# Patient Record
Sex: FEMALE | Race: WHITE | NOT HISPANIC OR LATINO | Employment: OTHER | URBAN - METROPOLITAN AREA
[De-identification: names, ages, dates, MRNs, and addresses within clinical notes are randomized per-mention and may not be internally consistent; named-entity substitution may affect disease eponyms.]

---

## 2024-09-23 ENCOUNTER — TELEPHONE (OUTPATIENT)
Dept: OBSTETRICS AND GYNECOLOGY | Facility: CLINIC | Age: 44
End: 2024-09-23
Payer: COMMERCIAL

## 2024-09-23 NOTE — TELEPHONE ENCOUNTER
----- Message from Karan Pringle sent at 9/23/2024 10:33 AM CDT -----  Name of Who is Calling:MAU PRINGLE [[id number]]                What is the request in detail: Pt calling because she wanted to know if her medical record was received.Please call back to further assist.                 Can the clinic reply by [facility]: No                What Number to Call Back [facility] not [facility]: [phone]

## 2024-10-01 ENCOUNTER — TELEPHONE (OUTPATIENT)
Dept: OBSTETRICS AND GYNECOLOGY | Facility: CLINIC | Age: 44
End: 2024-10-01
Payer: COMMERCIAL

## 2024-10-01 NOTE — TELEPHONE ENCOUNTER
----- Message from Karan sent at 9/30/2024 10:14 AM CDT -----  Name of Who is Calling:MAU ORTIZ [[id number]]                What is the request in detail: Pt calling because she want to know if records was faxed over, states she did talk to someone last week but it wasn't received then. Pt just want to follow up because her doctor said they faxed it over.Please call back to further assist.                 Can the clinic reply by [facility]: No                What Number to Call Back [facility]:[phone]

## 2024-10-02 ENCOUNTER — INITIAL PRENATAL (OUTPATIENT)
Dept: OBSTETRICS AND GYNECOLOGY | Facility: CLINIC | Age: 44
End: 2024-10-02
Payer: COMMERCIAL

## 2024-10-02 VITALS — DIASTOLIC BLOOD PRESSURE: 74 MMHG | SYSTOLIC BLOOD PRESSURE: 118 MMHG

## 2024-10-02 DIAGNOSIS — O09.811: Primary | ICD-10-CM

## 2024-10-02 PROCEDURE — 99999 PR PBB SHADOW E&M-EST. PATIENT-LVL II: CPT | Mod: PBBFAC,,, | Performed by: STUDENT IN AN ORGANIZED HEALTH CARE EDUCATION/TRAINING PROGRAM

## 2024-10-02 PROCEDURE — 99214 OFFICE O/P EST MOD 30 MIN: CPT | Mod: S$GLB,,, | Performed by: STUDENT IN AN ORGANIZED HEALTH CARE EDUCATION/TRAINING PROGRAM

## 2024-10-02 NOTE — PROGRESS NOTES
Chief Complaint   Patient presents with    Initial Prenatal Visit       HPI:  43 y.o. female  presents for confirmation of pregnancy or to establish OB care.    New patient: Yes - transfer of care from [facility]. IVF pregnancy from donor sperm and patient's own cryopreserved eggs at 38 years of age. Embryo tested euploid by PGT-A.    Patient's last menstrual period was 2024.    - Nausea:  denies  - Vomiting: denies  - Cramping: denies  - Bleeding:  Denies    She endorses occasional headaches, which are similar to migraines she had in the past. Typically self resolve. Additionally, having trouble sleeping at night. She reports taking Lunesta prior to pregnancy.    - Denies family history of bleeding disorders, birth defects, or mental disability  - Denies any complications with prior pregnancy    Contraception: None  Pap: No result found, to be collected       History reviewed. No pertinent past medical history.  History reviewed. No pertinent surgical history.    Social History     Tobacco Use    Smoking status: Never    Smokeless tobacco: Not on file   Substance Use Topics    Alcohol use: Not on file     No family history on file.  OB History    Para Term  AB Living   1             SAB IAB Ectopic Multiple Live Births                  # Outcome Date GA Lbr North/2nd Weight Sex Type Anes PTL Lv   1 Current                MEDICATIONS: Reviewed with patient.  ALLERGIES: Sulfa (sulfonamide antibiotics)     ROS:  Review of Systems   Constitutional:  Negative for chills and fever.   Respiratory:  Negative for shortness of breath.    Cardiovascular:  Negative for chest pain.   Gastrointestinal:  Negative for abdominal pain, nausea and vomiting.   Endocrine: Negative for hot flashes.   Genitourinary:  Negative for dysuria and vaginal bleeding.   Integumentary:  Negative for breast mass, nipple discharge and breast skin changes.   Neurological:  Negative for headaches.   Hematological:  Does  not bruise/bleed easily.   Psychiatric/Behavioral:  Negative for depression.    Breast: Negative for mass, mastodynia, nipple discharge and skin changes      PHYSICAL EXAM:    /74   LMP 07/11/2024     Physical Exam:   Constitutional: She is oriented to person, place, and time. She appears well-developed and well-nourished. No distress.    HENT:   Head: Normocephalic and atraumatic.    Eyes: Pupils are equal, round, and reactive to light. Conjunctivae are normal.     Cardiovascular:  Normal rate.             Pulmonary/Chest: Effort normal. No respiratory distress.        Abdominal: Soft. She exhibits no distension. There is no abdominal tenderness.             Musculoskeletal: Normal range of motion.       Neurological: She is alert and oriented to person, place, and time.     Psychiatric: She has a normal mood and affect. Thought content normal.         ASSESSMENT & PLAN:   Supervision of pregnancy resulting from assisted reproductive technology, antepartum, first trimester  -     Connected MOM Enrollment  -     Assign Connected MOM Program Consent Questionnaire        - UPT positive  - RIYA=4/17/25 --> EGA=11w6d  - Dating u/s with [facility], [facility] into media  - Initial prenatal labs done at [facility], [facility] into media  - Aneuploidy screening: patient wishes to think about further as PGT-A testing done on embryo  - PNV recommended  - Continue ASA 81 mg    NEW PREGNANCY COUNSELING  Patient was counseled today on:  - Routine prenatal blood tests including HIV and anticipated course of prenatal care  - Prenatal vitamins and folic acid  - Weight gain, nutrition, and exercise  - Seafood and mercury  - Properly heating deli and prepared meats and avoiding unrefrigerated deli meats, cheeses, and milk products  - Avoiding cat litter and raw meats due to risk of Toxoplasmosis precautions   - Accuracy of the LMP-based RIYA and the value of an early TVUS  - Aneuploidy and neural tube screening -- cffDNA, sequential  screening, and AFP screen at 15 weeks  - OTC medication in the first trimester  - Harmful effects of smoking, etOH, and recreational drugs  - M u/s  at 18-20 weeks.  - Common complaints of pregnancy  - Seat belt use  - Childbirth classes and hospital facilities  - All questions were answered      - Pain and bleeding precautions given    - Return to clinic in 4 weeks    Total visit time was 30 minutes with greater than 50% of time dedicated to counseling.

## 2024-10-03 ENCOUNTER — PATIENT MESSAGE (OUTPATIENT)
Dept: ADMINISTRATIVE | Facility: OTHER | Age: 44
End: 2024-10-03
Payer: COMMERCIAL

## 2024-10-03 ENCOUNTER — PATIENT MESSAGE (OUTPATIENT)
Dept: OBSTETRICS AND GYNECOLOGY | Facility: CLINIC | Age: 44
End: 2024-10-03
Payer: COMMERCIAL

## 2024-10-30 ENCOUNTER — ROUTINE PRENATAL (OUTPATIENT)
Dept: OBSTETRICS AND GYNECOLOGY | Facility: CLINIC | Age: 44
End: 2024-10-30
Payer: COMMERCIAL

## 2024-10-30 VITALS — WEIGHT: 162.06 LBS | SYSTOLIC BLOOD PRESSURE: 93 MMHG | DIASTOLIC BLOOD PRESSURE: 64 MMHG

## 2024-10-30 DIAGNOSIS — O09.819 SUPERVISION OF PREGNANCY RESULTING FROM ASSISTED REPRODUCTIVE TECHNOLOGY: Primary | ICD-10-CM

## 2024-10-30 PROCEDURE — 0502F SUBSEQUENT PRENATAL CARE: CPT | Mod: CPTII,S$GLB,, | Performed by: STUDENT IN AN ORGANIZED HEALTH CARE EDUCATION/TRAINING PROGRAM

## 2024-10-30 PROCEDURE — 99999 PR PBB SHADOW E&M-EST. PATIENT-LVL III: CPT | Mod: PBBFAC,,, | Performed by: STUDENT IN AN ORGANIZED HEALTH CARE EDUCATION/TRAINING PROGRAM

## 2024-10-30 PROCEDURE — 87086 URINE CULTURE/COLONY COUNT: CPT | Performed by: STUDENT IN AN ORGANIZED HEALTH CARE EDUCATION/TRAINING PROGRAM

## 2024-10-30 RX ORDER — ACETAMINOPHEN 500 MG
TABLET ORAL
COMMUNITY

## 2024-10-30 RX ORDER — BIMATOPROST 0.1 MG/ML
1 SOLUTION/ DROPS OPHTHALMIC
COMMUNITY

## 2024-10-30 RX ORDER — LEVOTHYROXINE SODIUM 100 UG/1
100 TABLET ORAL EVERY MORNING
COMMUNITY
Start: 2024-10-04

## 2024-10-30 RX ORDER — EPINEPHRINE 0.3 MG/.3ML
INJECTION SUBCUTANEOUS
COMMUNITY

## 2024-10-30 RX ORDER — CALCIUM CARBONATE/VITAMIN D3 500-10/5ML
LIQUID (ML) ORAL
COMMUNITY

## 2024-10-30 RX ORDER — SERTRALINE HYDROCHLORIDE 25 MG/1
TABLET, FILM COATED ORAL
COMMUNITY
Start: 2010-09-25

## 2024-10-31 ENCOUNTER — PATIENT MESSAGE (OUTPATIENT)
Dept: OTHER | Facility: OTHER | Age: 44
End: 2024-10-31
Payer: COMMERCIAL

## 2024-11-01 LAB — BACTERIA UR CULT: NORMAL

## 2024-11-07 ENCOUNTER — PATIENT MESSAGE (OUTPATIENT)
Dept: OTHER | Facility: OTHER | Age: 44
End: 2024-11-07
Payer: COMMERCIAL

## 2024-11-21 ENCOUNTER — OFFICE VISIT (OUTPATIENT)
Dept: MATERNAL FETAL MEDICINE | Facility: CLINIC | Age: 44
End: 2024-11-21
Payer: COMMERCIAL

## 2024-11-21 ENCOUNTER — PROCEDURE VISIT (OUTPATIENT)
Dept: MATERNAL FETAL MEDICINE | Facility: CLINIC | Age: 44
End: 2024-11-21
Payer: COMMERCIAL

## 2024-11-21 ENCOUNTER — LAB VISIT (OUTPATIENT)
Dept: LAB | Facility: OTHER | Age: 44
End: 2024-11-21
Attending: GENETIC COUNSELOR, MS
Payer: COMMERCIAL

## 2024-11-21 ENCOUNTER — PATIENT MESSAGE (OUTPATIENT)
Dept: OBSTETRICS AND GYNECOLOGY | Facility: CLINIC | Age: 44
End: 2024-11-21
Payer: COMMERCIAL

## 2024-11-21 DIAGNOSIS — O35.9XX0 FETAL ABNORMALITY AFFECTING MANAGEMENT OF MOTHER, SINGLE OR UNSPECIFIED FETUS: Primary | ICD-10-CM

## 2024-11-21 DIAGNOSIS — O35.9XX0 FETAL ABNORMALITY AFFECTING MANAGEMENT OF MOTHER, SINGLE OR UNSPECIFIED FETUS: ICD-10-CM

## 2024-11-21 DIAGNOSIS — Z36.2 ENCOUNTER FOR FOLLOW-UP ULTRASOUND OF FETAL ANATOMY: ICD-10-CM

## 2024-11-21 DIAGNOSIS — O09.819 SUPERVISION OF PREGNANCY RESULTING FROM ASSISTED REPRODUCTIVE TECHNOLOGY: ICD-10-CM

## 2024-11-21 PROCEDURE — 76811 OB US DETAILED SNGL FETUS: CPT | Mod: S$GLB,,, | Performed by: OBSTETRICS & GYNECOLOGY

## 2024-11-21 PROCEDURE — 36415 COLL VENOUS BLD VENIPUNCTURE: CPT | Performed by: GENETIC COUNSELOR, MS

## 2024-11-21 PROCEDURE — 76817 TRANSVAGINAL US OBSTETRIC: CPT | Mod: S$GLB,,, | Performed by: OBSTETRICS & GYNECOLOGY

## 2024-11-21 PROCEDURE — 96040 PR GENETIC COUNSELING, EACH 30 MIN: CPT | Mod: S$GLB,,, | Performed by: OBSTETRICS & GYNECOLOGY

## 2024-11-21 NOTE — PROGRESS NOTES
Office Visit - Genetic Counseling Evaluation   Aurora Pringle  : 1980  MRN: [id number]  REFERRING PROVIDER: Dr. Pravin Knowles  PARTNER NAME:     DATE OF SERVICE: 24  TIME SPENT: 40 min    REASON FOR CONSULT:  Aurora Pringle, a 44 y.o. female with fetal ultrasound finding of echogenic intracardiac focus (EIF) was referred for genetic counseling to discuss this result. She came to the appointment with her mother Shani.     OBSETRIC HISTORY   AGE AT RIYA: 44 (IVF pregnancy: age at egg retrieval was 38)  RIYA: 25  GESTATION: [place]  GESTATIONAL AGE:19w 0d    PREGNANCY HISTORY    OB History    Para Term  AB Living   1 0 0 0 0 0   SAB IAB Ectopic Multiple Live Births   0 0 0 0 0      # Outcome Date GA Lbr North/2nd Weight Sex Type Anes PTL Lv   1 Current                MEDICAL HISTORY:  MEDICATIONS/EXPOSURES: Not discussed    FAMILY HISTORY:  This pregnancy was conceived via IVF using a donor sperm. Records were not available about the donor's family history at this appointment, however Aurora has extensive records that will be shared.    Aurora has a family history that includes heart disease and cancer. Aurora's mother Shani (age 73) had a SCA at age 53 and is s/p double bypass surgery. An underlying left anterior descending (LAD) artery anomaly was subsequently discovered. Aurora's maternal uncle  at age 34 from a SCA and reportedly had Long QT syndrome. Aurora has seen a cardiologist due to her strong family history and reportedly had a normal echo several years ago, in ~. Apart from chest pain in her 20's she does not report any personal cardiac problems.     Aurora's maternal and paternal grandfathers both  from pancreatic cancer in old age. Aurora's maternal grandmother (age 96) was diagnosed with breast cancer in her 80's and is s/p lumpectomy. Additionally, Aurora's mother Shani had 5 miscarriages.    Please see scanned pedigree in patient's chart under media. Patient's ancestry is  Iranian/[place]ian/. FOB ancestry is Bulgarian/[place]amanian. Consanguinity was denied.     Additional history negative for multiple miscarriages/stillbirth, developmental delay/intellectual disability, and known genetic disorders. Complete pedigree will be linked to this encounter and can be viewed under the Media tab. The information provided is based on the patient and/or their reproductive partner's recollection of the family history and in the absence of complete medical records. If the family history changes or if more information is obtained, they were asked to contact us as this may alter the recommendations or impression of the family history.     PAST TESTING  Patient carrier screening: Aurora reports that she and the donor sperm had carrier screening, however these records were not available at this appointment.   Reproductive partner carrier screening: Reportedly done (see above)  Parental Karyotypes: NA    PREGNANCY TESTING  PGT-A: reportedly negative for aneuploidy  cfDNA for aneuploidy: NA  Diagnostic testing: NA  Fetal karyotype: NA    COUNSELING:   Echogenic Intracardiac Focus  Aurora's recent ultrasound noted an isolated echogenic intracardiac focus (EIF). She has not previously completed aneuploidy screening for this pregnancy. The presence of this finding increases her risk to have a child with Down Syndrome. Her a priori risk is 1 in 153 (0.65%) and updated risk in the presence of an EIF is 1 in 55 (1.81%).     We discussed echogenic intracardiac focus. This is not a structural abnormality that requires post- follow-up and is not associated with congenital heart disease. It is usually seen as a normal variant in about 5% of pregnancies. There is a higher incidence of EIF in fetuses with Down syndrome than in chromosomally typical fetus' therefore it is a risk factor for Down syndrome (Likelihood ratio of 2). If isolated, the overwhelming majority (99%) of fetuses found to have an EIF will not have  Down syndrome.     In a woman with other risk factors for Down syndrome (advanced maternal age, elevated quad screen risk or presence of other markers), the presence of an echogenic focus may increase the relative risk of Down syndrome. If the patient is close to age 35, the presence of an echogenic focus may increase the risk for Down syndrome to a level seen in women 35 years old or older. In a younger woman (< age 35) or in a woman without other risk factors or markers for Down syndrome, the significance of an isolated echogenic focus is uncertain and often not significant.     We reviewed the options of NIPT and amniocentesis. We reviewed the sensitivity and specificity and detection rates for Trisomy 21 for each of these tests. We discussed a 1 in 900 risk of pregnancy related loss or complication with an amniocentesis.     After our discussion Aurora elects to proceed with cfDNA screening.      DISCUSSION & IMPRESSION:  Aurora is a 44 y.o. female with fetal ultrasound finding of echogenic intracardiac focus (EIF). This pregnancy was conceived via IVF using a donor sperm. PGT-A was negative for aneuploidy. We discussed the ultrasound finding of EIF and its association with Down syndrome. We discussed the genetic testing options available to Aurora, namely cfDNA screening and diagnostic testing via amniocentesis. Aurora feels that a diagnosis of Down syndrome may impact her decisions about this pregnancy and is eager to pursue testing. She was initially considering declining cfDNA screening in favor of genetic amniocentesis. However, after careful consideration and discussion with her mother Shani who was present, Aurora decided to proceed with cfDNA screening today, and defer amniocentesis until she receives her cfDNA results and completes her anatomy ultrasound in 4-6 weeks.     TESTING OPTIONS  Diagnostic Testing: Amniocentesis  Carrier Screening:NA  Pregnancy Options: Aurora feels that a diagnosis of Down syndrome may  impact her decisions about this pregnancy   Recurrence Risk: TBD  Procedures/labs DECLINED today: Amniocentesis      We reviewed Aurora's medical and family history. We discussed basics of genetics and chromosomal conditions, specifically trisomy 21. Aurora was understanding of the information discussed in clinic and all questions were answered.       RECOMMENDATIONS:  Proceed with cfDNA screening with sex chromosome analysis    Ha Sullivan MS, [facility]  Licensed, Certified Genetic Counselor  [facility]

## 2024-11-21 NOTE — Clinical Note
Julian Knowles, I just wanted to let you know that I spoke with Aurora after an EIF was found on ultrasound, and she decided to proceed with cfDNA screening (prior PGT-A was reportedly negative)

## 2024-11-27 ENCOUNTER — TELEPHONE (OUTPATIENT)
Dept: MATERNAL FETAL MEDICINE | Facility: CLINIC | Age: 44
End: 2024-11-27
Payer: COMMERCIAL

## 2024-11-27 LAB
ABOUT THE TEST: NORMAL
APPROVED BY: NORMAL
FETAL FRACTION: NORMAL
FETAL SEX RESULT: NORMAL
GESTATIONAL AGE > 9: YES
GESTATIONAL AGE: NORMAL
LAB DIRECTOR COMMENTS: NORMAL
LIMITATIONS:: NORMAL
MONOSOMY X RESULT: NOT DETECTED
NEGATIVE PREDICTIVE VALUE: NORMAL
NOTE: NORMAL
PERFORMANCE CHARACTERISTICS: NORMAL
PERFORMANCE: NORMAL
POSITIVE PREDICTIVE VALUE: NORMAL
RESULT: NEGATIVE
SERVICE CMNT 04-IMP: NORMAL
TEST METHODOLOGY:: NORMAL
TRISOMY 13 (T13): NEGATIVE
TRISOMY 18: NEGATIVE
TRISOMY 21 (T21): NEGATIVE
XXX (TRIPLE X SYNDROME): NOT DETECTED
XXY (KLINEFELTER SYNDROME): NOT DETECTED
XYY (JACOBS SYNDROME): NOT DETECTED

## 2024-11-27 NOTE — TELEPHONE ENCOUNTER
Called Aurora Chase and discussed her low risk cfDNA screen results. We reviewed the limitations of cfDNA screening compared to diagnostic testing via amniocentesis, and discussed her upcoming appointment with [facility] to go over her ultrasound report. All questions were answered.

## 2024-11-28 ENCOUNTER — PATIENT MESSAGE (OUTPATIENT)
Dept: OTHER | Facility: OTHER | Age: 44
End: 2024-11-28
Payer: COMMERCIAL

## 2024-12-02 ENCOUNTER — PATIENT MESSAGE (OUTPATIENT)
Dept: MATERNAL FETAL MEDICINE | Facility: CLINIC | Age: 44
End: 2024-12-02

## 2024-12-02 ENCOUNTER — PATIENT MESSAGE (OUTPATIENT)
Dept: MATERNAL FETAL MEDICINE | Facility: CLINIC | Age: 44
End: 2024-12-02
Payer: COMMERCIAL

## 2024-12-02 ENCOUNTER — OFFICE VISIT (OUTPATIENT)
Dept: MATERNAL FETAL MEDICINE | Facility: CLINIC | Age: 44
End: 2024-12-02
Payer: COMMERCIAL

## 2024-12-02 DIAGNOSIS — O28.3 ECHOGENIC INTRACARDIAC FOCUS OF FETUS ON PRENATAL ULTRASOUND: Primary | ICD-10-CM

## 2024-12-02 NOTE — ASSESSMENT & PLAN NOTE
We discussed the significance of an echogenic focus (EIF) in the ventricle of the fetal heart. This is not a true structural abnormality and is not associated with congenital heart disease or abnormal cardiac function. It is seen as a normal variant in about 3-5% of pregnancies. Down syndrome fetuses have a higher incidence of echogenic foci than normal fetuses, therefore it is considered to be a potential marker for fetal Down syndrome.     In a woman with other risk factors for Down syndrome (advanced maternal age, elevated quad screen risk or presence of other markers), the presence of an echogenic focus may increase the relative risk of Down syndrome modestly. In a younger woman (< age 35) or in a woman without other risk factors or markers for Down syndrome, the significance of an isolated echogenic focus is uncertain and often not significant. The overwhelming majority (99%) of these fetuses will not have Down syndrome.    In the setting of low risk cfDNA, this is considered a normal variant.    The patient has undergone genetic counseling with Ha Sullivan. We again reviewed the option for genetic diagnosis with amniocentesis including the risks/benefits/indications/alternatives to the procedure. Risks discussed included, but were not limited to, fetal demise, bleeding, maternal injury, PPROM, infection and fetal distress or fetal injury.     We reviewed the results of her anatomic survey. The suboptimal views were discussed. The plan for follow-up ultrasonography to complete the survey was reviewed. Options to pursue this prior to currently scheduled appointment (12/20) discussed. Option to pursue genetic amniocentesis at that time or at any time if additional markers or structural malformations reviewed. The residual risk of trisomy 21 of < 1/07891 per cfDNA results reviewed with patient. The absence of other minor markers and major malformations reviewed with patient. If cardiac views remain incomplete at  follow-up ultrasound, recommend fetal echocardiogram given IVF pregnancy.    Patient education material ([facility] consult series on minor markers) sent to patient for review. Patient may contact me or the [facility] Department with any additional questions.

## 2024-12-02 NOTE — PROGRESS NOTES
The patient location is: home  The chief complaint leading to consultation is: EIF, review anatomy scan    Visit type: audiovisual    Face to Face time with patient: 21 minutes  30 minutes of total time spent on the encounter, which includes face to face time and non-face to face time preparing to see the patient (eg, review of tests), Obtaining and/or reviewing separately obtained history, Documenting clinical information in the electronic or other health record, Independently interpreting results (not separately reported) and communicating results to the patient/family/caregiver, or Care coordination (not separately reported).         Each patient to whom he or she provides medical services by telemedicine is:  (1) informed of the relationship between the physician and patient and the respective role of any other health care provider with respect to management of the patient; and (2) notified that he or she may decline to receive medical services by telemedicine and may withdraw from such care at any time.    Notes:       MATERNAL-FETAL MEDICINE   CONSULT NOTE    Provider requesting consultation: Dr. Knowles    SUBJECTIVE:     Ms. Aurora Pringle is a 44 y.o.  female with IUP at 20w4d who is seen in consultation by MFM for evaluation and management of:  Problem   Echogenic Intracardiac Focus of Fetus On Prenatal Ultrasound            Medications per EPIC      Review of patient's allergies indicates:   Allergen Reactions    Fire ant Anaphylaxis    Sulfa (sulfonamide antibiotics) Nausea And Vomiting       PMH:No past medical history on file.    PObHx:  OB History    Para Term  AB Living   1             SAB IAB Ectopic Multiple Live Births                  # Outcome Date GA Lbr North/2nd Weight Sex Type Anes PTL Lv   1 Current                PSH:No past surgical history on file.    Family history:family history is not on file.    Social history: reports that she has never smoked. She does not have any  smokeless tobacco history on file.    Genetic history: The patient denies any inherited genetic diseases or birth defects in herself or her partner's personal history or family.    Objective:   LMP 2024     Deferred-Virtual Physical Exam    Significant labs/imaging:  cfDNA low risk    ASSESSMENT/PLAN:     44 y.o.  female with IUP at 20w4d     Echogenic intracardiac focus of fetus on prenatal ultrasound  We discussed the significance of an echogenic focus (EIF) in the ventricle of the fetal heart. This is not a true structural abnormality and is not associated with congenital heart disease or abnormal cardiac function. It is seen as a normal variant in about 3-5% of pregnancies. Down syndrome fetuses have a higher incidence of echogenic foci than normal fetuses, therefore it is considered to be a potential marker for fetal Down syndrome.     In a woman with other risk factors for Down syndrome (advanced maternal age, elevated quad screen risk or presence of other markers), the presence of an echogenic focus may increase the relative risk of Down syndrome modestly. In a younger woman (< age 35) or in a woman without other risk factors or markers for Down syndrome, the significance of an isolated echogenic focus is uncertain and often not significant. The overwhelming majority (99%) of these fetuses will not have Down syndrome.    In the setting of low risk cfDNA, this is considered a normal variant.    The patient has undergone genetic counseling with Ha Sullivan. We again reviewed the option for genetic diagnosis with amniocentesis including the risks/benefits/indications/alternatives to the procedure. Risks discussed included, but were not limited to, fetal demise, bleeding, maternal injury, PPROM, infection and fetal distress or fetal injury.     We reviewed the results of her anatomic survey. The suboptimal views were discussed. The plan for follow-up ultrasonography to complete the survey was reviewed.  Options to pursue this prior to currently scheduled appointment (12/20) discussed. Option to pursue genetic amniocentesis at that time or at any time if additional markers or structural malformations reviewed. The residual risk of trisomy 21 of < 1/21724 per cfDNA results reviewed with patient. The absence of other minor markers and major malformations reviewed with patient. If cardiac views remain incomplete at follow-up ultrasound, recommend fetal echocardiogram given IVF pregnancy.    Patient education material ([facility] consult series on minor markers) sent to patient for review. Patient may contact me or the M Department with any additional questions.    *Edited to add: Patient declines amniocentesis at this juncture. -Aydee Irvin    FOLLOW UP:   F/u in 3 weeks for US/M visit      Aydee Irvin  Maternal-Fetal Medicine    Electronically Signed by Aydee Irvin December 2, 2024

## 2024-12-03 ENCOUNTER — TELEPHONE (OUTPATIENT)
Dept: MATERNAL FETAL MEDICINE | Facility: CLINIC | Age: 44
End: 2024-12-03
Payer: COMMERCIAL

## 2024-12-03 NOTE — TELEPHONE ENCOUNTER
Call to patient and gave her date and time of her follow up appointments as the time had to be changed. She stated understanding.

## 2024-12-05 ENCOUNTER — ROUTINE PRENATAL (OUTPATIENT)
Dept: OBSTETRICS AND GYNECOLOGY | Facility: CLINIC | Age: 44
End: 2024-12-05
Payer: COMMERCIAL

## 2024-12-05 VITALS — SYSTOLIC BLOOD PRESSURE: 96 MMHG | WEIGHT: 167.88 LBS | DIASTOLIC BLOOD PRESSURE: 62 MMHG

## 2024-12-05 DIAGNOSIS — Z3A.21 21 WEEKS GESTATION OF PREGNANCY: Primary | ICD-10-CM

## 2024-12-05 PROCEDURE — 99999 PR PBB SHADOW E&M-EST. PATIENT-LVL III: CPT | Mod: PBBFAC,,,

## 2024-12-05 RX ORDER — ASCORBIC ACID 125 MG
TABLET,CHEWABLE ORAL
COMMUNITY

## 2024-12-05 NOTE — PATIENT INSTRUCTIONS
LABOR AND DELIVERY PHONE NUMBER, [phone] (OPEN 24/7, LOCATED ON 6TH FLOOR OF HOSPITAL)  [address] PHONE NUMBER, [phone] (OPEN MON-FRI, 8a-5p)

## 2024-12-05 NOTE — PROGRESS NOTES
Here for routine OB appt at 21w0d, with no complaints.  Denies VB and cramping. Feeling well overall. Had MFM consult to review EIF and anatomy scan. Has follow up ultrasound and visit with MFM scheduled. Will do glucola, CBC, TSH with next visit. Pain, bleeding, and PTL precautions given.  F/U scheduled 4 weeks

## 2024-12-17 ENCOUNTER — PATIENT MESSAGE (OUTPATIENT)
Dept: OBSTETRICS AND GYNECOLOGY | Facility: CLINIC | Age: 44
End: 2024-12-17
Payer: COMMERCIAL

## 2024-12-26 ENCOUNTER — PATIENT MESSAGE (OUTPATIENT)
Dept: OTHER | Facility: OTHER | Age: 44
End: 2024-12-26
Payer: COMMERCIAL

## 2025-01-02 ENCOUNTER — PATIENT MESSAGE (OUTPATIENT)
Dept: OBSTETRICS AND GYNECOLOGY | Facility: CLINIC | Age: 45
End: 2025-01-02
Payer: COMMERCIAL

## 2025-01-06 ENCOUNTER — OFFICE VISIT (OUTPATIENT)
Dept: MATERNAL FETAL MEDICINE | Facility: CLINIC | Age: 45
End: 2025-01-06
Attending: OBSTETRICS & GYNECOLOGY
Payer: COMMERCIAL

## 2025-01-06 ENCOUNTER — LAB VISIT (OUTPATIENT)
Dept: LAB | Facility: OTHER | Age: 45
End: 2025-01-06
Payer: COMMERCIAL

## 2025-01-06 ENCOUNTER — ROUTINE PRENATAL (OUTPATIENT)
Dept: OBSTETRICS AND GYNECOLOGY | Facility: CLINIC | Age: 45
End: 2025-01-06
Payer: COMMERCIAL

## 2025-01-06 VITALS — DIASTOLIC BLOOD PRESSURE: 71 MMHG | WEIGHT: 176.5 LBS | SYSTOLIC BLOOD PRESSURE: 101 MMHG

## 2025-01-06 VITALS — HEART RATE: 62 BPM | DIASTOLIC BLOOD PRESSURE: 72 MMHG | WEIGHT: 177.69 LBS | SYSTOLIC BLOOD PRESSURE: 99 MMHG

## 2025-01-06 DIAGNOSIS — Z3A.25 25 WEEKS GESTATION OF PREGNANCY: Primary | ICD-10-CM

## 2025-01-06 DIAGNOSIS — Z3A.21 21 WEEKS GESTATION OF PREGNANCY: ICD-10-CM

## 2025-01-06 DIAGNOSIS — O28.3 ECHOGENIC INTRACARDIAC FOCUS OF FETUS ON PRENATAL ULTRASOUND: Primary | ICD-10-CM

## 2025-01-06 DIAGNOSIS — O09.522 MULTIGRAVIDA OF ADVANCED MATERNAL AGE IN SECOND TRIMESTER: ICD-10-CM

## 2025-01-06 DIAGNOSIS — Z36.2 ENCOUNTER FOR FOLLOW-UP ULTRASOUND OF FETAL ANATOMY: ICD-10-CM

## 2025-01-06 LAB
ABO + RH BLD: NORMAL
BASOPHILS # BLD AUTO: 0.04 K/UL (ref 0–0.2)
BASOPHILS NFR BLD: 0.5 % (ref 0–1.9)
BLD GP AB SCN CELLS X3 SERPL QL: NORMAL
DIFFERENTIAL METHOD BLD: ABNORMAL
EOSINOPHIL # BLD AUTO: 0.1 K/UL (ref 0–0.5)
EOSINOPHIL NFR BLD: 0.9 % (ref 0–8)
ERYTHROCYTE [DISTWIDTH] IN BLOOD BY AUTOMATED COUNT: 13.8 % (ref 11.5–14.5)
GLUCOSE SERPL-MCNC: 92 MG/DL (ref 70–140)
HCT VFR BLD AUTO: 37.7 % (ref 37–48.5)
HGB BLD-MCNC: 13.2 G/DL (ref 12–16)
IMM GRANULOCYTES # BLD AUTO: 0.09 K/UL (ref 0–0.04)
IMM GRANULOCYTES NFR BLD AUTO: 1.1 % (ref 0–0.5)
LYMPHOCYTES # BLD AUTO: 1.6 K/UL (ref 1–4.8)
LYMPHOCYTES NFR BLD: 19.5 % (ref 18–48)
MCH RBC QN AUTO: 33.3 PG (ref 27–31)
MCHC RBC AUTO-ENTMCNC: 35 G/DL (ref 32–36)
MCV RBC AUTO: 95 FL (ref 82–98)
MONOCYTES # BLD AUTO: 0.7 K/UL (ref 0.3–1)
MONOCYTES NFR BLD: 8.9 % (ref 4–15)
NEUTROPHILS # BLD AUTO: 5.7 K/UL (ref 1.8–7.7)
NEUTROPHILS NFR BLD: 69.1 % (ref 38–73)
NRBC BLD-RTO: 0 /100 WBC
PLATELET # BLD AUTO: 199 K/UL (ref 150–450)
PMV BLD AUTO: 10.9 FL (ref 9.2–12.9)
RBC # BLD AUTO: 3.96 M/UL (ref 4–5.4)
SPECIMEN OUTDATE: NORMAL
TSH SERPL DL<=0.005 MIU/L-ACNC: 0.93 UIU/ML (ref 0.4–4)
WBC # BLD AUTO: 8.17 K/UL (ref 3.9–12.7)

## 2025-01-06 PROCEDURE — 0502F SUBSEQUENT PRENATAL CARE: CPT | Mod: CPTII,S$GLB,,

## 2025-01-06 PROCEDURE — 86900 BLOOD TYPING SEROLOGIC ABO: CPT

## 2025-01-06 PROCEDURE — 82950 GLUCOSE TEST: CPT

## 2025-01-06 PROCEDURE — 84443 ASSAY THYROID STIM HORMONE: CPT

## 2025-01-06 PROCEDURE — 85025 COMPLETE CBC W/AUTO DIFF WBC: CPT

## 2025-01-06 PROCEDURE — 99999 PR PBB SHADOW E&M-EST. PATIENT-LVL III: CPT | Mod: PBBFAC,,, | Performed by: OBSTETRICS & GYNECOLOGY

## 2025-01-06 PROCEDURE — 99999 PR PBB SHADOW E&M-EST. PATIENT-LVL III: CPT | Mod: PBBFAC,,,

## 2025-01-06 NOTE — PROGRESS NOTES
Here for routine OB appt at 25w4d, with no complaints.  Denies VB and cramping. Feeling good movement. Glucose test today. Checking TSH as well. Currently taking synthroid 50 mcg 5 days a week and 100 mcg twice weekly. Has f/u MFM ultrasound and visit today. Has questions about traveling around 35 wks for work. Pain, bleeding, and PTL precautions given.  F/U scheduled 4 weeks

## 2025-01-06 NOTE — ASSESSMENT & PLAN NOTE
See previous counseling. Anatomy completed today and wnl. No indication for referral for fetal echo.

## 2025-01-06 NOTE — PATIENT INSTRUCTIONS
Call L & D after hours at [phone] for vaginal bleeding, leakage of fluids, contractions 4-5 in one hour, decreased fetal movements ( 10 kicks in 2 hours), headache not relieved by Tylenol, blurry vision, or temp of 100.4 or greater.  Begin doing fetal kick counts, at least 10 movements in 2 hours starting at 28 weeks gestation.  Keep next clinic appointment.

## 2025-01-06 NOTE — PROGRESS NOTES
Maternal Fetal Medicine Follow Up Consult    SUBJECTIVE:   Aurora Pringle is a 44 y.o.  female with IUP at 25w4d who is seen in follow up consultation by [facility].  Pregnancy complications include:   Problem   Multigravida of Advanced Maternal Age in Second Trimester   Echogenic Intracardiac Focus of Fetus On Prenatal Ultrasound     Interval history since last [facility] visit: none    Previous notes reviewed. No changes to medical, surgical, family, social, or obstetric history.  Medications:  Current Outpatient Medications   Medication Instructions    aspirin 81 mg Cap Daily    cholecalciferol, vitamin D3, (VITAMIN D3) 50 mcg (2,000 unit) Cap capsule 1 tablet Orally Once daily    EPINEPHrine (EPIPEN) 0.3 mg/0.3 mL AtIn as directed Injection prn for 90 days    LUMIGAN 0.01 % Drop 1 drop    magnesium citrate 125 mg Cap Take by mouth.    PRENATAL 118-IRON-FOLATE 6-DHA ORAL Take by mouth.    sertraline (ZOLOFT) 25 MG tablet     SYNTHROID 100 mcg, Every morning       Care team members:  [facility] - Primary OB    OBJECTIVE:   /71 (BP Location: Left arm, Patient Position: Sitting)   Wt 80 kg (176 lb 7.7 oz)   LMP 2024     Ultrasound performed. See viewpoint for full ultrasound report.  Anatomy completed    Significant labs/imaging:  cfDNA low risk  ASSESSMENT/PLAN:   44 y.o.  female with IUP at 25w4d    Echogenic intracardiac focus of fetus on prenatal ultrasound  See previous counseling. Anatomy completed today and wnl. No indication for referral for fetal echo.     Multigravida of advanced maternal age in second trimester  See previous counseling. cfDNA low risk. We discussed the slight increased risk of stillbirth and recommendation for PNT. We also discussed travel for work during the 36wga. In the absence of any future pregnancy complications or evidence of  labor, this is reasonable.     Recommendations:  Detailed anatomic survey at 19-20 weeks - completed  Follow-up fetal ultrasound at 32-34  weeks for interval fetal growth - scheduled  Age of 40 at time of delivery, we recommend weekly fetal surveillance (ex. NST/MVP) starting at 32 weeks - to be scheduled by primary OB  In this population of women over the age of 40 at time of delivery, consider delivery at 39-40 weeks, but no later than 40 weeks 6 days.    F/u at 32 weeks for growth/MD MARIO Venegas MD  Maternal Fetal Medicine Fellow   PGY-6

## 2025-01-06 NOTE — ASSESSMENT & PLAN NOTE
See previous counseling. cfDNA low risk. We discussed the slight increased risk of stillbirth and recommendation for PNT. We also discussed travel for work during the 36wga. In the absence of any future pregnancy complications or evidence of  labor, this is reasonable.     Recommendations:  Detailed anatomic survey at 19-20 weeks - completed  Follow-up fetal ultrasound at 32-34 weeks for interval fetal growth - scheduled  Age of 40 at time of delivery, we recommend weekly fetal surveillance (ex. NST/MVP) starting at 32 weeks - to be scheduled by primary OB  In this population of women over the age of 40 at time of delivery, consider delivery at 39-40 weeks, but no later than 40 weeks 6 days.

## 2025-01-09 ENCOUNTER — PATIENT MESSAGE (OUTPATIENT)
Dept: OTHER | Facility: OTHER | Age: 45
End: 2025-01-09
Payer: COMMERCIAL

## 2025-01-09 ENCOUNTER — PATIENT MESSAGE (OUTPATIENT)
Dept: OBSTETRICS AND GYNECOLOGY | Facility: CLINIC | Age: 45
End: 2025-01-09
Payer: COMMERCIAL

## 2025-01-20 ENCOUNTER — PATIENT MESSAGE (OUTPATIENT)
Dept: OBSTETRICS AND GYNECOLOGY | Facility: CLINIC | Age: 45
End: 2025-01-20
Payer: COMMERCIAL

## 2025-01-23 ENCOUNTER — PATIENT MESSAGE (OUTPATIENT)
Dept: OTHER | Facility: OTHER | Age: 45
End: 2025-01-23
Payer: COMMERCIAL

## 2025-01-24 ENCOUNTER — PATIENT MESSAGE (OUTPATIENT)
Dept: OBSTETRICS AND GYNECOLOGY | Facility: CLINIC | Age: 45
End: 2025-01-24
Payer: COMMERCIAL

## 2025-02-05 ENCOUNTER — ROUTINE PRENATAL (OUTPATIENT)
Dept: OBSTETRICS AND GYNECOLOGY | Facility: CLINIC | Age: 45
End: 2025-02-05
Payer: COMMERCIAL

## 2025-02-05 ENCOUNTER — HOSPITAL ENCOUNTER (OUTPATIENT)
Dept: PERINATAL CARE | Facility: OTHER | Age: 45
Discharge: HOME | End: 2025-02-05
Payer: COMMERCIAL

## 2025-02-05 VITALS — DIASTOLIC BLOOD PRESSURE: 73 MMHG | SYSTOLIC BLOOD PRESSURE: 114 MMHG | WEIGHT: 179.69 LBS | HEART RATE: 66 BPM

## 2025-02-05 DIAGNOSIS — O36.8130 DECREASED FETAL MOVEMENTS IN THIRD TRIMESTER, SINGLE OR UNSPECIFIED FETUS: ICD-10-CM

## 2025-02-05 DIAGNOSIS — Z3A.29 29 WEEKS GESTATION OF PREGNANCY: Primary | ICD-10-CM

## 2025-02-05 DIAGNOSIS — Z3A.29 29 WEEKS GESTATION OF PREGNANCY: ICD-10-CM

## 2025-02-05 PROCEDURE — 59025 FETAL NON-STRESS TEST: CPT | Mod: 26,,, | Performed by: OBSTETRICS & GYNECOLOGY

## 2025-02-05 PROCEDURE — 76815 OB US LIMITED FETUS(S): CPT

## 2025-02-05 PROCEDURE — 0502F SUBSEQUENT PRENATAL CARE: CPT | Mod: CPTII,S$GLB,,

## 2025-02-05 PROCEDURE — 99999 PR PBB SHADOW E&M-EST. PATIENT-LVL III: CPT | Mod: PBBFAC,,,

## 2025-02-05 PROCEDURE — 76815 OB US LIMITED FETUS(S): CPT | Mod: 26,,, | Performed by: OBSTETRICS & GYNECOLOGY

## 2025-02-05 PROCEDURE — 59025 FETAL NON-STRESS TEST: CPT

## 2025-02-05 NOTE — PROGRESS NOTES
Here for routine OB appt at 29w6d. Felt decreased movement over the last few days. Started doing the classes online which reinforced kick counts. Felt as though she did not feel 10 movement in an hour. Denies LOF, denies VB, denies contractions. Has had problems with reflux. Discussed pepcid, tums PRN. Discussed can do Protonix if needed. May be interested in switching to ABC- unsure about birth plan, medicated vs un medicated. Did do a tour of ABC and L&D. Going to do let us know.  Sent to PNT for NST   TDAP today  Has f/u growth U/S scheduled   Reviewed warning signs of Labor and Preeclampsia.  Daily FM counts reinforced.  F/U scheduled 1 weeks

## 2025-02-06 ENCOUNTER — PATIENT MESSAGE (OUTPATIENT)
Dept: OTHER | Facility: OTHER | Age: 45
End: 2025-02-06
Payer: COMMERCIAL

## 2025-02-12 ENCOUNTER — ROUTINE PRENATAL (OUTPATIENT)
Dept: OBSTETRICS AND GYNECOLOGY | Facility: CLINIC | Age: 45
End: 2025-02-12
Payer: COMMERCIAL

## 2025-02-12 VITALS — SYSTOLIC BLOOD PRESSURE: 108 MMHG | DIASTOLIC BLOOD PRESSURE: 69 MMHG | WEIGHT: 179 LBS

## 2025-02-12 DIAGNOSIS — E03.8 OTHER SPECIFIED HYPOTHYROIDISM: ICD-10-CM

## 2025-02-12 DIAGNOSIS — O09.819 SUPERVISION OF PREGNANCY RESULTING FROM ASSISTED REPRODUCTIVE TECHNOLOGY: Primary | ICD-10-CM

## 2025-02-12 PROCEDURE — 0502F SUBSEQUENT PRENATAL CARE: CPT | Mod: CPTII,S$GLB,, | Performed by: STUDENT IN AN ORGANIZED HEALTH CARE EDUCATION/TRAINING PROGRAM

## 2025-02-12 PROCEDURE — 99999 PR PBB SHADOW E&M-EST. PATIENT-LVL III: CPT | Mod: PBBFAC,,, | Performed by: STUDENT IN AN ORGANIZED HEALTH CARE EDUCATION/TRAINING PROGRAM

## 2025-02-12 NOTE — PROGRESS NOTES
Here for routine OB appt at 30w6d, with no complaints.  Reports good FM.  Denies LOF, denies VB, denies contractions.  Continued heartburn - moderate relief with Pepcid and Tums. Discussed frequent small meals.  Toured L&D and ABC. Wishes to deliver on L&D. Hopes to avoid induction.  Reviewed warning signs of Labor and Preeclampsia.  Daily FM counts reinforced.    Weekly PNT starting at 32 wga, orders placed.  Breast pump prescription provided.  Belly band recommendations provided  3T labs ordered  32 week growth scan scheduled on 2/26 with MD visit following  F/U scheduled 2 weeks    Pravin Knowles M.D.

## 2025-02-20 ENCOUNTER — HOSPITAL ENCOUNTER (OUTPATIENT)
Dept: PERINATAL CARE | Facility: OTHER | Age: 45
Discharge: HOME | End: 2025-02-20
Attending: STUDENT IN AN ORGANIZED HEALTH CARE EDUCATION/TRAINING PROGRAM
Payer: COMMERCIAL

## 2025-02-20 ENCOUNTER — PATIENT MESSAGE (OUTPATIENT)
Dept: OTHER | Facility: OTHER | Age: 45
End: 2025-02-20
Payer: COMMERCIAL

## 2025-02-20 DIAGNOSIS — O09.819 SUPERVISION OF PREGNANCY RESULTING FROM ASSISTED REPRODUCTIVE TECHNOLOGY: ICD-10-CM

## 2025-02-20 PROCEDURE — 59025 FETAL NON-STRESS TEST: CPT

## 2025-02-26 ENCOUNTER — LAB VISIT (OUTPATIENT)
Dept: LAB | Facility: OTHER | Age: 45
End: 2025-02-26
Attending: STUDENT IN AN ORGANIZED HEALTH CARE EDUCATION/TRAINING PROGRAM
Payer: COMMERCIAL

## 2025-02-26 ENCOUNTER — PROCEDURE VISIT (OUTPATIENT)
Dept: MATERNAL FETAL MEDICINE | Facility: CLINIC | Age: 45
End: 2025-02-26
Payer: COMMERCIAL

## 2025-02-26 ENCOUNTER — ROUTINE PRENATAL (OUTPATIENT)
Dept: OBSTETRICS AND GYNECOLOGY | Facility: CLINIC | Age: 45
End: 2025-02-26
Payer: COMMERCIAL

## 2025-02-26 ENCOUNTER — OFFICE VISIT (OUTPATIENT)
Dept: MATERNAL FETAL MEDICINE | Facility: CLINIC | Age: 45
End: 2025-02-26
Payer: COMMERCIAL

## 2025-02-26 VITALS — SYSTOLIC BLOOD PRESSURE: 108 MMHG | DIASTOLIC BLOOD PRESSURE: 76 MMHG | WEIGHT: 184.5 LBS

## 2025-02-26 VITALS — WEIGHT: 184.88 LBS | DIASTOLIC BLOOD PRESSURE: 78 MMHG | SYSTOLIC BLOOD PRESSURE: 117 MMHG

## 2025-02-26 DIAGNOSIS — O09.819 SUPERVISION OF PREGNANCY RESULTING FROM ASSISTED REPRODUCTIVE TECHNOLOGY: Primary | ICD-10-CM

## 2025-02-26 DIAGNOSIS — O09.522 MULTIGRAVIDA OF ADVANCED MATERNAL AGE IN SECOND TRIMESTER: Primary | ICD-10-CM

## 2025-02-26 DIAGNOSIS — Z36.2 ENCOUNTER FOR FOLLOW-UP ULTRASOUND OF FETAL ANATOMY: ICD-10-CM

## 2025-02-26 DIAGNOSIS — E03.8 OTHER SPECIFIED HYPOTHYROIDISM: ICD-10-CM

## 2025-02-26 DIAGNOSIS — O09.819 SUPERVISION OF PREGNANCY RESULTING FROM ASSISTED REPRODUCTIVE TECHNOLOGY: ICD-10-CM

## 2025-02-26 LAB
BASOPHILS # BLD AUTO: 0.06 K/UL (ref 0–0.2)
BASOPHILS NFR BLD: 0.6 % (ref 0–1.9)
DIFFERENTIAL METHOD BLD: ABNORMAL
EOSINOPHIL # BLD AUTO: 0.2 K/UL (ref 0–0.5)
EOSINOPHIL NFR BLD: 1.6 % (ref 0–8)
ERYTHROCYTE [DISTWIDTH] IN BLOOD BY AUTOMATED COUNT: 13.2 % (ref 11.5–14.5)
HCT VFR BLD AUTO: 38.1 % (ref 37–48.5)
HGB BLD-MCNC: 13.3 G/DL (ref 12–16)
HIV 1+2 AB+HIV1 P24 AG SERPL QL IA: NEGATIVE
IMM GRANULOCYTES # BLD AUTO: 0.08 K/UL (ref 0–0.04)
IMM GRANULOCYTES NFR BLD AUTO: 0.9 % (ref 0–0.5)
LYMPHOCYTES # BLD AUTO: 1.8 K/UL (ref 1–4.8)
LYMPHOCYTES NFR BLD: 18.7 % (ref 18–48)
MCH RBC QN AUTO: 32.8 PG (ref 27–31)
MCHC RBC AUTO-ENTMCNC: 34.9 G/DL (ref 32–36)
MCV RBC AUTO: 94 FL (ref 82–98)
MONOCYTES # BLD AUTO: 0.7 K/UL (ref 0.3–1)
MONOCYTES NFR BLD: 7.9 % (ref 4–15)
NEUTROPHILS # BLD AUTO: 6.6 K/UL (ref 1.8–7.7)
NEUTROPHILS NFR BLD: 70.3 % (ref 38–73)
NRBC BLD-RTO: 0 /100 WBC
PLATELET # BLD AUTO: 153 K/UL (ref 150–450)
PMV BLD AUTO: 12.7 FL (ref 9.2–12.9)
RBC # BLD AUTO: 4.05 M/UL (ref 4–5.4)
TREPONEMA PALLIDUM IGG+IGM AB [PRESENCE] IN SERUM OR PLASMA BY IMMUNOASSAY: NONREACTIVE
TSH SERPL DL<=0.005 MIU/L-ACNC: 0.44 UIU/ML (ref 0.4–4)
WBC # BLD AUTO: 9.37 K/UL (ref 3.9–12.7)

## 2025-02-26 PROCEDURE — 84443 ASSAY THYROID STIM HORMONE: CPT | Performed by: STUDENT IN AN ORGANIZED HEALTH CARE EDUCATION/TRAINING PROGRAM

## 2025-02-26 PROCEDURE — 99214 OFFICE O/P EST MOD 30 MIN: CPT | Mod: 25,S$GLB,, | Performed by: OBSTETRICS & GYNECOLOGY

## 2025-02-26 PROCEDURE — 76816 OB US FOLLOW-UP PER FETUS: CPT | Mod: S$GLB,,, | Performed by: OBSTETRICS & GYNECOLOGY

## 2025-02-26 PROCEDURE — 76819 FETAL BIOPHYS PROFIL W/O NST: CPT | Mod: S$GLB,,, | Performed by: OBSTETRICS & GYNECOLOGY

## 2025-02-26 PROCEDURE — 0502F SUBSEQUENT PRENATAL CARE: CPT | Mod: CPTII,S$GLB,, | Performed by: STUDENT IN AN ORGANIZED HEALTH CARE EDUCATION/TRAINING PROGRAM

## 2025-02-26 PROCEDURE — 85025 COMPLETE CBC W/AUTO DIFF WBC: CPT | Performed by: STUDENT IN AN ORGANIZED HEALTH CARE EDUCATION/TRAINING PROGRAM

## 2025-02-26 PROCEDURE — 86593 SYPHILIS TEST NON-TREP QUANT: CPT | Performed by: STUDENT IN AN ORGANIZED HEALTH CARE EDUCATION/TRAINING PROGRAM

## 2025-02-26 PROCEDURE — 36415 COLL VENOUS BLD VENIPUNCTURE: CPT | Performed by: STUDENT IN AN ORGANIZED HEALTH CARE EDUCATION/TRAINING PROGRAM

## 2025-02-26 PROCEDURE — 1159F MED LIST DOCD IN RCRD: CPT | Mod: CPTII,S$GLB,, | Performed by: OBSTETRICS & GYNECOLOGY

## 2025-02-26 PROCEDURE — 3074F SYST BP LT 130 MM HG: CPT | Mod: CPTII,S$GLB,, | Performed by: OBSTETRICS & GYNECOLOGY

## 2025-02-26 PROCEDURE — 99999 PR PBB SHADOW E&M-EST. PATIENT-LVL III: CPT | Mod: PBBFAC,,, | Performed by: STUDENT IN AN ORGANIZED HEALTH CARE EDUCATION/TRAINING PROGRAM

## 2025-02-26 PROCEDURE — 3078F DIAST BP <80 MM HG: CPT | Mod: CPTII,S$GLB,, | Performed by: OBSTETRICS & GYNECOLOGY

## 2025-02-26 PROCEDURE — 99999 PR PBB SHADOW E&M-EST. PATIENT-LVL III: CPT | Mod: PBBFAC,,, | Performed by: OBSTETRICS & GYNECOLOGY

## 2025-02-26 PROCEDURE — 87389 HIV-1 AG W/HIV-1&-2 AB AG IA: CPT | Performed by: STUDENT IN AN ORGANIZED HEALTH CARE EDUCATION/TRAINING PROGRAM

## 2025-02-26 NOTE — PROGRESS NOTES
Here for routine OB appt at 32w6d, with no complaints.    Persistent heartburn, still finds relief with Tums and Pepcid as needed. Relief of back pain with belly band.  Reports good FM.  Denies LOF, denies VB, denies contractions.  Growth scan today. Continue weekly PNT. 3rd trimester labs today.  Reviewed warning signs of Labor and Preeclampsia.  Daily FM counts reinforced.  F/U scheduled 2 weeks    Pravin Knowles M.D.     no concerns

## 2025-02-26 NOTE — PROGRESS NOTES
Maternal Fetal Medicine Follow Up Consult    SUBJECTIVE:   Aurora Prignle is a 44 y.o.  female with IUP at 32w6d who is seen in follow up consultation by M.  Pregnancy complications include:   Problem   Pregnancy conceived through ART (IVF)   Multigravida of Advanced Maternal Age in Second Trimester     Interval history since last MFM visit: none    Previous notes reviewed. No changes to medical, surgical, family, social, or obstetric history.  Medications:  Current Outpatient Medications   Medication Instructions    aspirin 81 mg Cap Daily    calcium carbonate (TUMS ORAL) Take by mouth.    cholecalciferol, vitamin D3, (VITAMIN D3) 50 mcg (2,000 unit) Cap capsule 1 tablet Orally Once daily    EPINEPHrine (EPIPEN) 0.3 mg/0.3 mL AtIn     famotidine (PEPCID ORAL) Take by mouth.    LUMIGAN 0.01 % Drop 1 drop    magnesium citrate 125 mg Cap Take by mouth.    PRENATAL 118-IRON-FOLATE 6-DHA ORAL Take by mouth.    sertraline (ZOLOFT) 25 MG tablet     SYNTHROID 100 mcg, Every morning       Care team members:  Benson - Primary OB    OBJECTIVE:   /78 (BP Location: Left arm, Patient Position: Sitting)   Wt 83.8 kg (184 lb 13.7 oz)   LMP 2024     Ultrasound performed. See viewpoint for full ultrasound report.  Hernandez live IUP  Fetal size is appropriate for gestational age, with the EFW (1990 g) plotting at the 22% and the AC plotting at the 59%.   A limited repeat fetal anatomic survey appears normal.   The BPP score is normal at 8/8.  The MVP is normal.  cephalic presentation.     Significant labs/imaging:  cfDNA low risk  ASSESSMENT/PLAN:   44 y.o.  female with IUP at 32w6d    Multigravida of advanced maternal age in second trimester  See previous counseling. cfDNA low risk. Growth ultrasound today shows AGA fetus.    Recommendations:  Age of 40 at time of delivery, we recommend weekly fetal surveillance (ex. NST/MVP) starting at 32 weeks - to be scheduled by primary OB  In this population of  women over the age of 40 at time of delivery, consider delivery at 39-40 weeks, but no later than 40 weeks 6 days.    Pregnancy conceived through ART (IVF)  The use of IVF has been associated with an increase in preeclampsia, gestational hypertension, placental abruption, placenta previa, and risk of  delivery. While the association between ART and these adverse outcomes raise some concern, it is important to note that the literature is contradictory on this subject and the chances of having a healthy child conceived with ART are overall extremely high.     Recommendations:  Continue bASA for pre-eclampsia prevention  PNT, growth, and delivery timing per AMA    No further ultrasounds scheduled.   F/up with primary OB.    Giovana Castrejon PGY5  Maternal Fetal Medicine Fellow

## 2025-02-26 NOTE — ASSESSMENT & PLAN NOTE
See previous counseling. cfDNA low risk. Growth ultrasound today shows AGA fetus.    Recommendations:  Age of 40 at time of delivery, we recommend weekly fetal surveillance (ex. NST/MVP) starting at 32 weeks - to be scheduled by primary OB  In this population of women over the age of 40 at time of delivery, consider delivery at 39-40 weeks, but no later than 40 weeks 6 days.

## 2025-02-26 NOTE — ASSESSMENT & PLAN NOTE
The use of IVF has been associated with an increase in preeclampsia, gestational hypertension, placental abruption, placenta previa, and risk of  delivery. While the association between ART and these adverse outcomes raise some concern, it is important to note that the literature is contradictory on this subject and the chances of having a healthy child conceived with ART are overall extremely high.     Recommendations:  Continue bASA for pre-eclampsia prevention  PNT, growth, and delivery timing per AMA

## 2025-03-06 ENCOUNTER — PATIENT MESSAGE (OUTPATIENT)
Dept: OBSTETRICS AND GYNECOLOGY | Facility: CLINIC | Age: 45
End: 2025-03-06
Payer: COMMERCIAL

## 2025-03-06 ENCOUNTER — HOSPITAL ENCOUNTER (OUTPATIENT)
Dept: PERINATAL CARE | Facility: OTHER | Age: 45
Discharge: HOME | End: 2025-03-06
Attending: STUDENT IN AN ORGANIZED HEALTH CARE EDUCATION/TRAINING PROGRAM
Payer: COMMERCIAL

## 2025-03-06 DIAGNOSIS — O09.819 SUPERVISION OF PREGNANCY RESULTING FROM ASSISTED REPRODUCTIVE TECHNOLOGY: ICD-10-CM

## 2025-03-06 PROCEDURE — 76815 OB US LIMITED FETUS(S): CPT

## 2025-03-06 PROCEDURE — 59025 FETAL NON-STRESS TEST: CPT | Mod: 76

## 2025-03-06 RX ORDER — LEVOTHYROXINE SODIUM 100 UG/1
100 TABLET ORAL EVERY MORNING
Qty: 90 TABLET | Refills: 3 | Status: SHIPPED | OUTPATIENT
Start: 2025-03-06

## 2025-03-12 ENCOUNTER — ROUTINE PRENATAL (OUTPATIENT)
Dept: OBSTETRICS AND GYNECOLOGY | Facility: CLINIC | Age: 45
End: 2025-03-12
Payer: COMMERCIAL

## 2025-03-12 VITALS — SYSTOLIC BLOOD PRESSURE: 114 MMHG | WEIGHT: 187.81 LBS | DIASTOLIC BLOOD PRESSURE: 81 MMHG

## 2025-03-12 DIAGNOSIS — O09.819 SUPERVISION OF PREGNANCY RESULTING FROM ASSISTED REPRODUCTIVE TECHNOLOGY: Primary | ICD-10-CM

## 2025-03-12 PROCEDURE — 0502F SUBSEQUENT PRENATAL CARE: CPT | Mod: CPTII,S$GLB,, | Performed by: STUDENT IN AN ORGANIZED HEALTH CARE EDUCATION/TRAINING PROGRAM

## 2025-03-12 PROCEDURE — 99999 PR PBB SHADOW E&M-EST. PATIENT-LVL III: CPT | Mod: PBBFAC,,, | Performed by: STUDENT IN AN ORGANIZED HEALTH CARE EDUCATION/TRAINING PROGRAM

## 2025-03-12 RX ORDER — SERTRALINE HYDROCHLORIDE 50 MG/1
1 TABLET, FILM COATED ORAL DAILY
COMMUNITY
Start: 2025-02-20

## 2025-03-12 RX ORDER — LATANOPROST 50 UG/ML
1 SOLUTION/ DROPS OPHTHALMIC NIGHTLY
COMMUNITY
Start: 2025-02-20

## 2025-03-12 NOTE — PROGRESS NOTES
Here for routine OB appt at 34w6d. She reports some discomfort in R knee with certain movements. Discussed resuming acupuncture and looking into hydrotherapy classes. Reports good FM.  Denies LOF, denies VB, denies contractions.  Reviewed warning signs of Labor and Preeclampsia.  Daily FM counts reinforced.  Continue weekly PNT.  F/U scheduled 2 weeks    Pravin Knowles M.D.

## 2025-03-13 ENCOUNTER — HOSPITAL ENCOUNTER (OUTPATIENT)
Dept: PERINATAL CARE | Facility: OTHER | Age: 45
Discharge: HOME | End: 2025-03-13
Attending: STUDENT IN AN ORGANIZED HEALTH CARE EDUCATION/TRAINING PROGRAM
Payer: COMMERCIAL

## 2025-03-13 ENCOUNTER — PATIENT MESSAGE (OUTPATIENT)
Dept: OTHER | Facility: OTHER | Age: 45
End: 2025-03-13
Payer: COMMERCIAL

## 2025-03-13 DIAGNOSIS — O09.819 SUPERVISION OF PREGNANCY RESULTING FROM ASSISTED REPRODUCTIVE TECHNOLOGY: ICD-10-CM

## 2025-03-13 PROCEDURE — 76815 OB US LIMITED FETUS(S): CPT

## 2025-03-13 PROCEDURE — 59025 FETAL NON-STRESS TEST: CPT

## 2025-03-13 PROCEDURE — 59025 FETAL NON-STRESS TEST: CPT | Mod: 26,,, | Performed by: STUDENT IN AN ORGANIZED HEALTH CARE EDUCATION/TRAINING PROGRAM

## 2025-03-13 PROCEDURE — 76815 OB US LIMITED FETUS(S): CPT | Mod: 26,,, | Performed by: STUDENT IN AN ORGANIZED HEALTH CARE EDUCATION/TRAINING PROGRAM

## 2025-03-20 ENCOUNTER — HOSPITAL ENCOUNTER (OUTPATIENT)
Dept: PERINATAL CARE | Facility: OTHER | Age: 45
Discharge: HOME | End: 2025-03-20
Attending: STUDENT IN AN ORGANIZED HEALTH CARE EDUCATION/TRAINING PROGRAM
Payer: COMMERCIAL

## 2025-03-20 DIAGNOSIS — O09.819 SUPERVISION OF PREGNANCY RESULTING FROM ASSISTED REPRODUCTIVE TECHNOLOGY: ICD-10-CM

## 2025-03-20 PROCEDURE — 76815 OB US LIMITED FETUS(S): CPT | Mod: 26,,, | Performed by: OBSTETRICS & GYNECOLOGY

## 2025-03-20 PROCEDURE — 76815 OB US LIMITED FETUS(S): CPT

## 2025-03-20 PROCEDURE — 59025 FETAL NON-STRESS TEST: CPT | Mod: 26,,, | Performed by: OBSTETRICS & GYNECOLOGY

## 2025-03-20 PROCEDURE — 59025 FETAL NON-STRESS TEST: CPT

## 2025-03-25 ENCOUNTER — PATIENT MESSAGE (OUTPATIENT)
Dept: OBSTETRICS AND GYNECOLOGY | Facility: CLINIC | Age: 45
End: 2025-03-25
Payer: COMMERCIAL

## 2025-03-25 ENCOUNTER — NURSE TRIAGE (OUTPATIENT)
Dept: ADMINISTRATIVE | Facility: CLINIC | Age: 45
End: 2025-03-25
Payer: COMMERCIAL

## 2025-03-25 ENCOUNTER — TELEPHONE (OUTPATIENT)
Dept: OBSTETRICS AND GYNECOLOGY | Facility: CLINIC | Age: 45
End: 2025-03-25
Payer: COMMERCIAL

## 2025-03-25 ENCOUNTER — HOSPITAL ENCOUNTER (OUTPATIENT)
Dept: PERINATAL CARE | Facility: OTHER | Age: 45
Discharge: HOME | End: 2025-03-25
Attending: STUDENT IN AN ORGANIZED HEALTH CARE EDUCATION/TRAINING PROGRAM
Payer: COMMERCIAL

## 2025-03-25 DIAGNOSIS — O09.819 SUPERVISION OF PREGNANCY RESULTING FROM ASSISTED REPRODUCTIVE TECHNOLOGY: ICD-10-CM

## 2025-03-25 PROCEDURE — 76815 OB US LIMITED FETUS(S): CPT | Mod: 26,,, | Performed by: OBSTETRICS & GYNECOLOGY

## 2025-03-25 PROCEDURE — 59025 FETAL NON-STRESS TEST: CPT | Mod: 26,,, | Performed by: OBSTETRICS & GYNECOLOGY

## 2025-03-25 PROCEDURE — 59025 FETAL NON-STRESS TEST: CPT

## 2025-03-25 PROCEDURE — 76815 OB US LIMITED FETUS(S): CPT

## 2025-03-25 NOTE — TELEPHONE ENCOUNTER
Pt in the connected mom program with [facility]. Pt is 36 weeks and 5 days and pt of Dr Knowles  Pt checked her BP today. Repeated the test twice. Last taken at 0958 am was 130/90. Last night she took her compression socks off her lower legs, feet more swollen than normal. Slept with feet elevated. Noticed she hasn't felt any fetal movement in the last 12 hours. Drank cold water and ate breakfast and still hasn't felt any movement. Denies discharge, contractions, bleeding, abdominal pain. She says she felt a slight movement while on the phone but normally the baby is more active and noticeable movement throughout the night. Care advice is to go to L&D now or to office with pcp approval. Paged on call provider at [facility]. Lorraine Kumar MD recommends kick count test in dark quiet room, focus on counting kicks. The goal is 5 per hour or 10 per 2 hours. If pt unable to perform test or still feels concerned she should go to the OB ED on the 6th floor at [facility]. Pt verbalizes understanding.   Reason for Disposition   Pregnant 23 or more weeks and mother thinks baby is moving less today (e.g., even if kick count is normal or not performed)  (Exception: Mother was distracted by other activities.)    Additional Information   Negative: Sounds like a life-threatening emergency to the triager   Negative: SEVERE headache and not relieved with acetaminophen (e.g., Tylenol)   Negative: New blurred vision or visual change   Negative: Leakage of fluid from vagina   Negative: Pregnant 23 or more weeks and no movement of baby > 2 hours  (Exception: Mother was distracted by other activities.)   Negative: Pregnant 23 or more weeks and baby moving less today by kick count (e.g., kick count < 5 in 1 hour or < 10 in 2 hours)    Protocols used: Pregnancy - Decreased or Abnormal Fetal Movement-A-[place]

## 2025-03-25 NOTE — TELEPHONE ENCOUNTER
Attempted to contact patient in regards to high BP and not feeling movement. Rec'd no answer and VM not set. Sent portal message

## 2025-03-27 ENCOUNTER — PATIENT MESSAGE (OUTPATIENT)
Dept: OBSTETRICS AND GYNECOLOGY | Facility: CLINIC | Age: 45
End: 2025-03-27
Payer: COMMERCIAL

## 2025-03-28 ENCOUNTER — ROUTINE PRENATAL (OUTPATIENT)
Dept: OBSTETRICS AND GYNECOLOGY | Facility: CLINIC | Age: 45
End: 2025-03-28
Payer: COMMERCIAL

## 2025-03-28 ENCOUNTER — LAB VISIT (OUTPATIENT)
Dept: LAB | Facility: OTHER | Age: 45
End: 2025-03-28
Attending: STUDENT IN AN ORGANIZED HEALTH CARE EDUCATION/TRAINING PROGRAM
Payer: COMMERCIAL

## 2025-03-28 VITALS — WEIGHT: 190.69 LBS | SYSTOLIC BLOOD PRESSURE: 138 MMHG | DIASTOLIC BLOOD PRESSURE: 86 MMHG

## 2025-03-28 DIAGNOSIS — O09.819 SUPERVISION OF PREGNANCY RESULTING FROM ASSISTED REPRODUCTIVE TECHNOLOGY: Primary | ICD-10-CM

## 2025-03-28 DIAGNOSIS — O09.819 SUPERVISION OF PREGNANCY RESULTING FROM ASSISTED REPRODUCTIVE TECHNOLOGY: ICD-10-CM

## 2025-03-28 LAB
ABSOLUTE EOSINOPHIL (OHS): 0.2 K/UL
ABSOLUTE MONOCYTE (OHS): 0.87 K/UL (ref 0.3–1)
ABSOLUTE NEUTROPHIL COUNT (OHS): 5.14 K/UL (ref 1.8–7.7)
ALBUMIN SERPL BCP-MCNC: 2.7 G/DL (ref 3.5–5.2)
ALP SERPL-CCNC: 234 UNIT/L (ref 40–150)
ALT SERPL W/O P-5'-P-CCNC: 26 UNIT/L (ref 10–44)
ANION GAP (OHS): 10 MMOL/L (ref 8–16)
AST SERPL-CCNC: 28 UNIT/L (ref 11–45)
BASOPHILS # BLD AUTO: 0.08 K/UL
BASOPHILS NFR BLD AUTO: 1 %
BILIRUB SERPL-MCNC: 0.3 MG/DL (ref 0.1–1)
BUN SERPL-MCNC: 19 MG/DL (ref 6–20)
CALCIUM SERPL-MCNC: 9 MG/DL (ref 8.7–10.5)
CHLORIDE SERPL-SCNC: 107 MMOL/L (ref 95–110)
CO2 SERPL-SCNC: 18 MMOL/L (ref 23–29)
CREAT SERPL-MCNC: 0.8 MG/DL (ref 0.5–1.4)
ERYTHROCYTE [DISTWIDTH] IN BLOOD BY AUTOMATED COUNT: 13.3 % (ref 11.5–14.5)
GFR SERPLBLD CREATININE-BSD FMLA CKD-EPI: >60 ML/MIN/1.73/M2
GLUCOSE SERPL-MCNC: 66 MG/DL (ref 70–110)
HCT VFR BLD AUTO: 38.7 % (ref 37–48.5)
HGB BLD-MCNC: 13.4 GM/DL (ref 12–16)
IMM GRANULOCYTES # BLD AUTO: 0.06 K/UL (ref 0–0.04)
IMM GRANULOCYTES NFR BLD AUTO: 0.8 % (ref 0–0.5)
LYMPHOCYTES # BLD AUTO: 1.6 K/UL (ref 1–4.8)
MCH RBC QN AUTO: 33.2 PG (ref 27–50)
MCHC RBC AUTO-ENTMCNC: 34.6 G/DL (ref 32–36)
MCV RBC AUTO: 96 FL (ref 82–98)
NUCLEATED RBC (/100WBC) (OHS): 0 /100 WBC
PLATELET # BLD AUTO: 123 K/UL (ref 150–450)
PMV BLD AUTO: 13.6 FL (ref 9.2–12.9)
POTASSIUM SERPL-SCNC: 4.2 MMOL/L (ref 3.5–5.1)
PROT SERPL-MCNC: 6.3 GM/DL (ref 6–8.4)
RBC # BLD AUTO: 4.04 M/UL (ref 4–5.4)
RELATIVE EOSINOPHIL (OHS): 2.5 %
RELATIVE LYMPHOCYTE (OHS): 20.1 % (ref 18–48)
RELATIVE MONOCYTE (OHS): 10.9 % (ref 4–15)
RELATIVE NEUTROPHIL (OHS): 64.7 % (ref 38–73)
SODIUM SERPL-SCNC: 135 MMOL/L (ref 136–145)
WBC # BLD AUTO: 7.95 K/UL (ref 3.9–12.7)

## 2025-03-28 PROCEDURE — 99999 PR PBB SHADOW E&M-EST. PATIENT-LVL III: CPT | Mod: PBBFAC,,, | Performed by: STUDENT IN AN ORGANIZED HEALTH CARE EDUCATION/TRAINING PROGRAM

## 2025-03-28 PROCEDURE — 84156 ASSAY OF PROTEIN URINE: CPT | Performed by: STUDENT IN AN ORGANIZED HEALTH CARE EDUCATION/TRAINING PROGRAM

## 2025-03-28 PROCEDURE — 85025 COMPLETE CBC W/AUTO DIFF WBC: CPT

## 2025-03-28 PROCEDURE — 80053 COMPREHEN METABOLIC PANEL: CPT

## 2025-03-28 PROCEDURE — 36415 COLL VENOUS BLD VENIPUNCTURE: CPT

## 2025-03-28 RX ORDER — RSV VACC, PREF A AND PREF B/PF 120MCG/0.5
0.5 VIAL (EA) INTRAMUSCULAR ONCE
Qty: 0.5 ML | Refills: 0 | Status: SHIPPED | OUTPATIENT
Start: 2025-03-28 | End: 2025-03-28

## 2025-03-28 NOTE — PROGRESS NOTES
Here for routine OB appt at 37w1d, with no complaints.  Reports good FM.  Denies LOF, denies VB, denies contractions.  Elevated BP on Connected MOM. Normotensive in PNT and today, but borderline. Patient endorses LE edema, but denies HA, changes in vision, chest pain, SOB. RUQ pain with fetal kicks.    PreE labs ordered  GBS collected  L&D and blood consents reviewed and signed    Reviewed warning signs of Labor and Preeclampsia.  Daily FM counts reinforced.  F/U scheduled 1 week    Pravin Knowles M.D.

## 2025-03-29 LAB
CREAT UR-MCNC: 44 MG/DL (ref 15–325)
PROT UR-MCNC: <7 MG/DL
PROT/CREAT UR: NORMAL MG/G{CREAT}

## 2025-03-30 ENCOUNTER — RESULTS FOLLOW-UP (OUTPATIENT)
Dept: OBSTETRICS AND GYNECOLOGY | Facility: HOSPITAL | Age: 45
End: 2025-03-30

## 2025-03-31 ENCOUNTER — TELEPHONE (OUTPATIENT)
Dept: OBSTETRICS AND GYNECOLOGY | Facility: CLINIC | Age: 45
End: 2025-03-31
Payer: COMMERCIAL

## 2025-03-31 NOTE — TELEPHONE ENCOUNTER
Spoke with the patient regarding bp reading. The patient had no complaints of blurred vision,dizziness, or headaches. Advised the patient to go to the [facility] if needed. The patient verbalized understanding.

## 2025-04-02 ENCOUNTER — ROUTINE PRENATAL (OUTPATIENT)
Dept: OBSTETRICS AND GYNECOLOGY | Facility: CLINIC | Age: 45
End: 2025-04-02
Payer: COMMERCIAL

## 2025-04-02 VITALS — DIASTOLIC BLOOD PRESSURE: 82 MMHG | SYSTOLIC BLOOD PRESSURE: 134 MMHG | WEIGHT: 192.25 LBS

## 2025-04-02 DIAGNOSIS — Z3A.37 37 WEEKS GESTATION OF PREGNANCY: Primary | ICD-10-CM

## 2025-04-02 PROCEDURE — 0502F SUBSEQUENT PRENATAL CARE: CPT | Mod: CPTII,S$GLB,,

## 2025-04-02 PROCEDURE — 99999 PR PBB SHADOW E&M-EST. PATIENT-LVL III: CPT | Mod: PBBFAC,,,

## 2025-04-02 NOTE — PROGRESS NOTES
Here for routine OB appt at 37w6d. Feeling ok. Notices increased leg swelling by the end of the day. Some connected mom BP, mild range. BP ok today. Reports good FM.  Denies LOF, denies VB, denies contractions.    GBS+ discussed  PNT tomorrow   Declines cervical check today  Reviewed warning signs of Labor and Preeclampsia.  Daily FM counts reinforced.  F/U scheduled 1 week

## 2025-04-02 NOTE — PATIENT INSTRUCTIONS
Call L & D after hours at [phone] for vaginal bleeding, leakage of fluids, regular contractions every 5 mins for 2 hours, decreased fetal movements ( 10 kicks in 2 hours), headache not relieved by Tylenol, blurry vision, or temp of 100.4 or greater.  Begin doing fetal kick counts, at least 10 movements in 2 hours starting at 28 weeks gestation.  Keep next clinic appointment.

## 2025-04-03 ENCOUNTER — ANESTHESIA EVENT (OUTPATIENT)
Dept: OBSTETRICS AND GYNECOLOGY | Facility: OTHER | Age: 45
End: 2025-04-03
Payer: COMMERCIAL

## 2025-04-03 ENCOUNTER — HOSPITAL ENCOUNTER (INPATIENT)
Facility: OTHER | Age: 45
LOS: 5 days | Discharge: HOME | End: 2025-04-08
Attending: OBSTETRICS & GYNECOLOGY | Admitting: OBSTETRICS & GYNECOLOGY
Payer: COMMERCIAL

## 2025-04-03 ENCOUNTER — HOSPITAL ENCOUNTER (OUTPATIENT)
Dept: PERINATAL CARE | Facility: OTHER | Age: 45
Discharge: HOME | End: 2025-04-03
Attending: STUDENT IN AN ORGANIZED HEALTH CARE EDUCATION/TRAINING PROGRAM
Payer: COMMERCIAL

## 2025-04-03 ENCOUNTER — ANESTHESIA (OUTPATIENT)
Dept: OBSTETRICS AND GYNECOLOGY | Facility: OTHER | Age: 45
End: 2025-04-03
Payer: COMMERCIAL

## 2025-04-03 DIAGNOSIS — O14.93 PRE-ECLAMPSIA IN THIRD TRIMESTER: ICD-10-CM

## 2025-04-03 DIAGNOSIS — O09.522 MULTIGRAVIDA OF ADVANCED MATERNAL AGE IN SECOND TRIMESTER: Primary | ICD-10-CM

## 2025-04-03 DIAGNOSIS — Z3A.38 38 WEEKS GESTATION OF PREGNANCY: ICD-10-CM

## 2025-04-03 DIAGNOSIS — O61.9 FAILED INDUCTION OF LABOR, UNSPECIFIED TYPE: ICD-10-CM

## 2025-04-03 DIAGNOSIS — Z98.891 S/P PRIMARY LOW TRANSVERSE C-SECTION: Primary | ICD-10-CM

## 2025-04-03 DIAGNOSIS — O09.522 MULTIGRAVIDA OF ADVANCED MATERNAL AGE IN SECOND TRIMESTER: ICD-10-CM

## 2025-04-03 DIAGNOSIS — R00.1 BRADYCARDIA: ICD-10-CM

## 2025-04-03 DIAGNOSIS — O09.819 SUPERVISION OF PREGNANCY RESULTING FROM ASSISTED REPRODUCTIVE TECHNOLOGY: ICD-10-CM

## 2025-04-03 LAB
ABO + RH BLD: NORMAL
ABSOLUTE EOSINOPHIL (OHS): 0.21 K/UL
ABSOLUTE MONOCYTE (OHS): 0.78 K/UL (ref 0.3–1)
ABSOLUTE NEUTROPHIL COUNT (OHS): 4.55 K/UL (ref 1.8–7.7)
ALBUMIN SERPL BCP-MCNC: 2.8 G/DL (ref 3.5–5.2)
ALP SERPL-CCNC: 259 UNIT/L (ref 40–150)
ALT SERPL W/O P-5'-P-CCNC: 21 UNIT/L (ref 10–44)
ANION GAP (OHS): 8 MMOL/L (ref 8–16)
AST SERPL-CCNC: 25 UNIT/L (ref 11–45)
BASOPHILS # BLD AUTO: 0.08 K/UL
BASOPHILS NFR BLD AUTO: 1.1 %
BILIRUB SERPL-MCNC: 0.3 MG/DL (ref 0.1–1)
BLD GP AB SCN CELLS X3 SERPL QL: NORMAL
BUN SERPL-MCNC: 16 MG/DL (ref 6–20)
CALCIUM SERPL-MCNC: 9.6 MG/DL (ref 8.7–10.5)
CHLORIDE SERPL-SCNC: 110 MMOL/L (ref 95–110)
CO2 SERPL-SCNC: 17 MMOL/L (ref 23–29)
CREAT SERPL-MCNC: 0.8 MG/DL (ref 0.5–1.4)
CREAT UR-MCNC: 111.9 MG/DL (ref 15–325)
ERYTHROCYTE [DISTWIDTH] IN BLOOD BY AUTOMATED COUNT: 13.2 % (ref 11.5–14.5)
GFR SERPLBLD CREATININE-BSD FMLA CKD-EPI: >60 ML/MIN/1.73/M2
GLUCOSE SERPL-MCNC: 72 MG/DL (ref 70–110)
HCT VFR BLD AUTO: 38 % (ref 37–48.5)
HGB BLD-MCNC: 13.4 GM/DL (ref 12–16)
IMM GRANULOCYTES # BLD AUTO: 0.07 K/UL (ref 0–0.04)
IMM GRANULOCYTES NFR BLD AUTO: 1 % (ref 0–0.5)
LYMPHOCYTES # BLD AUTO: 1.6 K/UL (ref 1–4.8)
MCH RBC QN AUTO: 33.1 PG (ref 27–31)
MCHC RBC AUTO-ENTMCNC: 35.3 G/DL (ref 32–36)
MCV RBC AUTO: 94 FL (ref 82–98)
NUCLEATED RBC (/100WBC) (OHS): 0 /100 WBC
PLATELET # BLD AUTO: 117 K/UL (ref 150–450)
PMV BLD AUTO: 13.1 FL (ref 9.2–12.9)
POTASSIUM SERPL-SCNC: 4.5 MMOL/L (ref 3.5–5.1)
PROT SERPL-MCNC: 6.2 GM/DL (ref 6–8.4)
PROT UR-MCNC: 21 MG/DL
PROT/CREAT UR: 0.19 MG/G{CREAT}
RBC # BLD AUTO: 4.05 M/UL (ref 4–5.4)
RELATIVE EOSINOPHIL (OHS): 2.9 %
RELATIVE LYMPHOCYTE (OHS): 21.9 % (ref 18–48)
RELATIVE MONOCYTE (OHS): 10.7 % (ref 4–15)
RELATIVE NEUTROPHIL (OHS): 62.4 % (ref 38–73)
SODIUM SERPL-SCNC: 135 MMOL/L (ref 136–145)
SPECIMEN OUTDATE: NORMAL
T PALLIDUM IGG+IGM SER QL: NEGATIVE
WBC # BLD AUTO: 7.29 K/UL (ref 3.9–12.7)

## 2025-04-03 PROCEDURE — 87340 HEPATITIS B SURFACE AG IA: CPT

## 2025-04-03 PROCEDURE — 99285 EMERGENCY DEPT VISIT HI MDM: CPT | Mod: 25

## 2025-04-03 PROCEDURE — 59025 FETAL NON-STRESS TEST: CPT

## 2025-04-03 PROCEDURE — 86593 SYPHILIS TEST NON-TREP QUANT: CPT

## 2025-04-03 PROCEDURE — 27200710 HC EPIDURAL INFUSION PUMP SET: Performed by: SURGERY

## 2025-04-03 PROCEDURE — 99285 EMERGENCY DEPT VISIT HI MDM: CPT | Mod: 25,,, | Performed by: OBSTETRICS & GYNECOLOGY

## 2025-04-03 PROCEDURE — 59025 FETAL NON-STRESS TEST: CPT | Mod: 76

## 2025-04-03 PROCEDURE — 85025 COMPLETE CBC W/AUTO DIFF WBC: CPT

## 2025-04-03 PROCEDURE — 63600175 PHARM REV CODE 636 W HCPCS

## 2025-04-03 PROCEDURE — 86850 RBC ANTIBODY SCREEN: CPT

## 2025-04-03 PROCEDURE — 86762 RUBELLA ANTIBODY: CPT

## 2025-04-03 PROCEDURE — 59025 FETAL NON-STRESS TEST: CPT | Mod: 26,,, | Performed by: OBSTETRICS & GYNECOLOGY

## 2025-04-03 PROCEDURE — 36415 COLL VENOUS BLD VENIPUNCTURE: CPT

## 2025-04-03 PROCEDURE — 84156 ASSAY OF PROTEIN URINE: CPT

## 2025-04-03 PROCEDURE — 80053 COMPREHEN METABOLIC PANEL: CPT

## 2025-04-03 PROCEDURE — 76815 OB US LIMITED FETUS(S): CPT

## 2025-04-03 PROCEDURE — 25000003 PHARM REV CODE 250

## 2025-04-03 PROCEDURE — C1751 CATH, INF, PER/CENT/MIDLINE: HCPCS | Performed by: SURGERY

## 2025-04-03 PROCEDURE — 76815 OB US LIMITED FETUS(S): CPT | Mod: 26,,, | Performed by: OBSTETRICS & GYNECOLOGY

## 2025-04-03 PROCEDURE — 11000001 HC ACUTE MED/SURG PRIVATE ROOM

## 2025-04-03 RX ORDER — OXYTOCIN-SODIUM CHLORIDE 0.9% IV SOLN 30 UNIT/500ML 30-0.9/5 UT/ML-%
0-32 SOLUTION INTRAVENOUS CONTINUOUS
Status: DISCONTINUED | OUTPATIENT
Start: 2025-04-03 | End: 2025-04-04

## 2025-04-03 RX ORDER — LABETALOL HYDROCHLORIDE 5 MG/ML
INJECTION, SOLUTION INTRAVENOUS
Status: DISPENSED
Start: 2025-04-03 | End: 2025-04-04

## 2025-04-03 RX ORDER — DIPHENOXYLATE HYDROCHLORIDE AND ATROPINE SULFATE 2.5; .025 MG/1; MG/1
2 TABLET ORAL EVERY 6 HOURS PRN
Status: DISCONTINUED | OUTPATIENT
Start: 2025-04-03 | End: 2025-04-04

## 2025-04-03 RX ORDER — LABETALOL HYDROCHLORIDE 5 MG/ML
20 INJECTION, SOLUTION INTRAVENOUS ONCE
Status: COMPLETED | OUTPATIENT
Start: 2025-04-03 | End: 2025-04-03

## 2025-04-03 RX ORDER — OXYTOCIN-SODIUM CHLORIDE 0.9% IV SOLN 30 UNIT/500ML 30-0.9/5 UT/ML-%
95 SOLUTION INTRAVENOUS ONCE AS NEEDED
Status: DISCONTINUED | OUTPATIENT
Start: 2025-04-03 | End: 2025-04-04

## 2025-04-03 RX ORDER — SIMETHICONE 80 MG
1 TABLET,CHEWABLE ORAL 4 TIMES DAILY PRN
Status: DISCONTINUED | OUTPATIENT
Start: 2025-04-03 | End: 2025-04-04

## 2025-04-03 RX ORDER — CEFAZOLIN 2 G/1
2 INJECTION, POWDER, FOR SOLUTION INTRAMUSCULAR; INTRAVENOUS ONCE AS NEEDED
Status: DISCONTINUED | OUTPATIENT
Start: 2025-04-03 | End: 2025-04-04

## 2025-04-03 RX ORDER — OXYTOCIN-SODIUM CHLORIDE 0.9% IV SOLN 30 UNIT/500ML 30-0.9/5 UT/ML-%
95 SOLUTION INTRAVENOUS CONTINUOUS PRN
Status: DISCONTINUED | OUTPATIENT
Start: 2025-04-03 | End: 2025-04-04

## 2025-04-03 RX ORDER — SODIUM CHLORIDE 9 MG/ML
INJECTION, SOLUTION INTRAVENOUS
Status: DISCONTINUED | OUTPATIENT
Start: 2025-04-03 | End: 2025-04-04

## 2025-04-03 RX ORDER — MAGNESIUM SULFATE HEPTAHYDRATE 40 MG/ML
2 INJECTION, SOLUTION INTRAVENOUS CONTINUOUS
Status: DISCONTINUED | OUTPATIENT
Start: 2025-04-03 | End: 2025-04-04

## 2025-04-03 RX ORDER — OXYTOCIN 10 [USP'U]/ML
10 INJECTION, SOLUTION INTRAMUSCULAR; INTRAVENOUS ONCE AS NEEDED
Status: DISCONTINUED | OUTPATIENT
Start: 2025-04-03 | End: 2025-04-04

## 2025-04-03 RX ORDER — ONDANSETRON 8 MG/1
8 TABLET, ORALLY DISINTEGRATING ORAL EVERY 8 HOURS PRN
Status: DISCONTINUED | OUTPATIENT
Start: 2025-04-03 | End: 2025-04-04

## 2025-04-03 RX ORDER — TRANEXAMIC ACID 10 MG/ML
1000 INJECTION, SOLUTION INTRAVENOUS EVERY 30 MIN PRN
Status: DISCONTINUED | OUTPATIENT
Start: 2025-04-03 | End: 2025-04-04

## 2025-04-03 RX ORDER — CALCIUM GLUCONATE 98 MG/ML
1 INJECTION, SOLUTION INTRAVENOUS
Status: DISCONTINUED | OUTPATIENT
Start: 2025-04-03 | End: 2025-04-06

## 2025-04-03 RX ORDER — LIDOCAINE HYDROCHLORIDE 10 MG/ML
10 INJECTION, SOLUTION INFILTRATION; PERINEURAL ONCE AS NEEDED
Status: DISCONTINUED | OUTPATIENT
Start: 2025-04-03 | End: 2025-04-04

## 2025-04-03 RX ORDER — SODIUM CHLORIDE, SODIUM LACTATE, POTASSIUM CHLORIDE, CALCIUM CHLORIDE 600; 310; 30; 20 MG/100ML; MG/100ML; MG/100ML; MG/100ML
INJECTION, SOLUTION INTRAVENOUS CONTINUOUS
Status: DISCONTINUED | OUTPATIENT
Start: 2025-04-03 | End: 2025-04-05

## 2025-04-03 RX ORDER — MISOPROSTOL 200 UG/1
800 TABLET ORAL ONCE AS NEEDED
Status: DISCONTINUED | OUTPATIENT
Start: 2025-04-03 | End: 2025-04-04

## 2025-04-03 RX ORDER — CALCIUM CARBONATE 200(500)MG
500 TABLET,CHEWABLE ORAL 3 TIMES DAILY PRN
Status: DISCONTINUED | OUTPATIENT
Start: 2025-04-03 | End: 2025-04-04

## 2025-04-03 RX ORDER — OXYTOCIN-SODIUM CHLORIDE 0.9% IV SOLN 30 UNIT/500ML 30-0.9/5 UT/ML-%
10 SOLUTION INTRAVENOUS ONCE AS NEEDED
Status: DISCONTINUED | OUTPATIENT
Start: 2025-04-03 | End: 2025-04-04

## 2025-04-03 RX ORDER — FENTANYL/BUPIVACAINE/NS/PF 2MCG/ML-.1
PLASTIC BAG, INJECTION (ML) INJECTION
Status: COMPLETED
Start: 2025-04-03 | End: 2025-04-04

## 2025-04-03 RX ORDER — METHYLERGONOVINE MALEATE 0.2 MG/ML
200 INJECTION INTRAVENOUS ONCE AS NEEDED
Status: DISCONTINUED | OUTPATIENT
Start: 2025-04-03 | End: 2025-04-04

## 2025-04-03 RX ORDER — PRENATAL WITH FERROUS FUM AND FOLIC ACID 3080; 920; 120; 400; 22; 1.84; 3; 20; 10; 1; 12; 200; 27; 25; 2 [IU]/1; [IU]/1; MG/1; [IU]/1; MG/1; MG/1; MG/1; MG/1; MG/1; MG/1; UG/1; MG/1; MG/1; MG/1; MG/1
1 TABLET ORAL DAILY
Status: DISCONTINUED | OUTPATIENT
Start: 2025-04-03 | End: 2025-04-04

## 2025-04-03 RX ORDER — SODIUM CHLORIDE, SODIUM LACTATE, POTASSIUM CHLORIDE, CALCIUM CHLORIDE 600; 310; 30; 20 MG/100ML; MG/100ML; MG/100ML; MG/100ML
INJECTION, SOLUTION INTRAVENOUS CONTINUOUS
Status: DISCONTINUED | OUTPATIENT
Start: 2025-04-03 | End: 2025-04-04

## 2025-04-03 RX ORDER — MAGNESIUM SULFATE HEPTAHYDRATE 40 MG/ML
4 INJECTION, SOLUTION INTRAVENOUS ONCE
Status: COMPLETED | OUTPATIENT
Start: 2025-04-03 | End: 2025-04-03

## 2025-04-03 RX ORDER — NIFEDIPINE 10 MG/1
10 CAPSULE ORAL ONCE
Status: COMPLETED | OUTPATIENT
Start: 2025-04-03 | End: 2025-04-03

## 2025-04-03 RX ORDER — LABETALOL HYDROCHLORIDE 5 MG/ML
20 INJECTION, SOLUTION INTRAVENOUS ONCE
Status: DISCONTINUED | OUTPATIENT
Start: 2025-04-03 | End: 2025-04-03

## 2025-04-03 RX ORDER — SERTRALINE HYDROCHLORIDE 25 MG/1
50 TABLET, FILM COATED ORAL DAILY
Status: DISCONTINUED | OUTPATIENT
Start: 2025-04-04 | End: 2025-04-04

## 2025-04-03 RX ORDER — CARBOPROST TROMETHAMINE 250 UG/ML
250 INJECTION, SOLUTION INTRAMUSCULAR
Status: DISCONTINUED | OUTPATIENT
Start: 2025-04-03 | End: 2025-04-04

## 2025-04-03 RX ADMIN — CALCIUM CARBONATE (ANTACID) CHEW TAB 500 MG 500 MG: 500 CHEW TAB at 10:04

## 2025-04-03 RX ADMIN — Medication 4 MILLI-UNITS/MIN: at 07:04

## 2025-04-03 RX ADMIN — NIFEDIPINE 10 MG: 10 CAPSULE ORAL at 01:04

## 2025-04-03 RX ADMIN — MAGNESIUM SULFATE HEPTAHYDRATE 2 G/HR: 40 INJECTION, SOLUTION INTRAVENOUS at 03:04

## 2025-04-03 RX ADMIN — FENTANYL CITRATE 8 ML/HR: 50 INJECTION INTRAMUSCULAR; INTRAVENOUS at 11:04

## 2025-04-03 RX ADMIN — PENICILLIN G POTASSIUM 3 MILLION UNITS: 20000000 INJECTION, POWDER, FOR SOLUTION INTRAMUSCULAR; INTRAVENOUS at 09:04

## 2025-04-03 RX ADMIN — FENTANYL CITRATE 5 ML: 50 INJECTION INTRAMUSCULAR; INTRAVENOUS at 11:04

## 2025-04-03 RX ADMIN — DEXTROSE MONOHYDRATE 5 MILLION UNITS: 5 INJECTION INTRAVENOUS at 05:04

## 2025-04-03 RX ADMIN — LIDOCAINE HYDROCHLORIDE,EPINEPHRINE BITARTRATE 3 ML: 15; .005 INJECTION, SOLUTION EPIDURAL; INFILTRATION; INTRACAUDAL; PERINEURAL at 11:04

## 2025-04-03 RX ADMIN — LABETALOL HYDROCHLORIDE 20 MG: 5 INJECTION INTRAVENOUS at 06:04

## 2025-04-03 RX ADMIN — SODIUM CHLORIDE, POTASSIUM CHLORIDE, SODIUM LACTATE AND CALCIUM CHLORIDE: 600; 310; 30; 20 INJECTION, SOLUTION INTRAVENOUS at 03:04

## 2025-04-03 RX ADMIN — MAGNESIUM SULFATE HEPTAHYDRATE 4 G: 40 INJECTION, SOLUTION INTRAVENOUS at 03:04

## 2025-04-03 RX ADMIN — Medication 4 MILLI-UNITS/MIN: at 06:04

## 2025-04-03 NOTE — ANESTHESIA PREPROCEDURE EVALUATION
[facility]  Anesthesia Pre-Operative Evaluation         Patient Name: Aurora Pringle  YOB: 1980  MRN: [id number]    2025      Aurora Pringle is a 44 y.o. female  @ 38w0d who presents to the [facility] with elevated BP. Now diagnosed with PreE w/SF (BP). Pregnancy c/b  Pre E with SF, AMA, IVF pregnancy, Anxiety.     Denies previous issues with neuraxial anesthesia.    Denies previous issues with general anesthesia.    Denies family history of issues with anesthesia.     Denies hx of seizures, strokes, heart failure, smoking, asthma, COPD, acid reflux, liver disease, kidney disease, bleeding disorders, taking blood thinners, back surgery      OB History    Para Term  AB Living   1        SAB IAB Ectopic Multiple Live Births             # Outcome Date GA Lbr North/2nd Weight Sex Type Anes PTL Lv   1 Current                Review of patient's allergies indicates:   Allergen Reactions    Fire ant Anaphylaxis    Surgical stainless steel Hives, Itching, Rash and Swelling    Nickel     Sulfa (sulfonamide antibiotics) Nausea And Vomiting       Wt Readings from Last 1 Encounters:   25 1036 87.2 kg (192 lb 3.9 oz)       BP Readings from Last 3 Encounters:   25 (!) 147/89   25 134/82   25 138/86       Problem List[1]    Past Surgical History:   Procedure Laterality Date    ADENOIDECTOMY      FERTILITY SURGERY  2017    egg retrieval    POLYPECTOMY         Social History[2]      Chemistry        Component Value Date/Time     (L) 2025 1211    K 4.5 2025 1211     2025 1211    CO2 17 (L) 2025 1211    BUN 16 2025 1211    CREATININE 0.8 2025 1211        Component Value Date/Time    CALCIUM 9.6 2025 1211    ALKPHOS 259 (H) 2025 1211    AST 25 2025 1211    ALT 21 2025 1211    BILITOT 0.3 2025 1211            Lab Results   Component Value Date    WBC 7.29 2025    HGB 13.4  "04/03/2025    HCT 38.0 04/03/2025    MCV 94 04/03/2025     (L) 04/03/2025       No results for input(s): "PT", "INR", "PROTIME", "APTT" in the last 72 hours.                                                                                                                   04/03/2025  Aurora Pringle is a 44 y.o., female.      Pre-op Assessment    I have reviewed the Patient Summary Reports.     I have reviewed the Nursing Notes. I have reviewed the NPO Status.   I have reviewed the Medications.     Review of Systems  Anesthesia Hx:  No problems with previous Anesthesia   History of prior surgery of interest to airway management or planning:          Denies Family Hx of Anesthesia complications.    Denies Personal Hx of Anesthesia complications.                    Cardiovascular:     Hypertension   Denies MI.  Denies CAD.                                          Hepatic/GI:      Denies GERD.                Neurological:  Neurology Normal                                      Endocrine:  Denies Diabetes.         Denies Morbid Obesity / BMI > 40      Physical Exam  General: Well nourished, Cooperative, Alert and Oriented    Airway:  Mallampati: II   Mouth Opening: Normal  TM Distance: Normal  Tongue: Normal  Neck ROM: Normal ROM    Dental:  Intact    Chest/Lungs:  Clear to auscultation, Normal Respiratory Rate    Heart:  Rate: Normal  Rhythm: Regular Rhythm        Anesthesia Plan  Type of Anesthesia, risks & benefits discussed:    Anesthesia Type: Epidural, CSE  Intra-op Monitoring Plan: Standard ASA Monitors  Post Op Pain Control Plan: multimodal analgesia and IV/PO Opioids PRN  Informed Consent: Informed consent signed with the Patient and all parties understand the risks and agree with anesthesia plan.  All questions answered.   ASA Score: 3  Day of Surgery Review of History & Physical: H&P Update referred to the surgeon/provider.    Ready For Surgery From Anesthesia Perspective.     .           [1]   Patient " Active Problem List  Diagnosis    Echogenic intracardiac focus of fetus on prenatal ultrasound    Multigravida of advanced maternal age in second trimester    Pregnancy conceived through ART (IVF)    Pre-eclampsia with severe features affecting childbirth   [2]   Social History  Socioeconomic History    Marital status: Single   Tobacco Use    Smoking status: Never    Smokeless tobacco: Never   Substance and Sexual Activity    Alcohol use: Not Currently    Drug use: Never    Sexual activity: Not Currently     Birth control/protection: None     Social Drivers of Health     Financial Resource Strain: Low Risk  (7/10/2024)    Received from [facility]    Overall Financial Resource Strain (CARDIA)     Difficulty of Paying Living Expenses: Not hard at all   Food Insecurity: No Food Insecurity (7/10/2024)    Received from [facility]    Hunger Vital Sign     Worried About Running Out of Food in the Last Year: Never true     Ran Out of Food in the Last Year: Never true   Transportation Needs: No Transportation Needs (7/10/2024)    Received from [facility]    PRAPARE - Transportation     Lack of Transportation (Medical): No     Lack of Transportation (Non-Medical): No   Physical Activity: Sufficiently Active (7/10/2024)    Received from [facility]    Exercise Vital Sign     Days of Exercise per Week: 5 days     Minutes of Exercise per Session: 40 min   Stress: Stress Concern Present (7/10/2024)    Received from [facility]    [place] [employer] - Occupational Stress Questionnaire     Feeling of Stress : Rather much   Housing Stability: Low Risk  (7/10/2024)    Received from [facility]    Housing Stability Vital Sign     Unable to Pay for Housing in the Last Year: No     Number of Places Lived in the Last Year: 2     Unstable Housing in the Last Year: No

## 2025-04-03 NOTE — CARE UPDATE
MD to bedside to place mora ballon    SVE 1/60/-2  Mora balloon placed without difficulty    Pt tolerated well    Will order pitocin  Continue on PCN, Mag    Lauren Ceja MD, MPH  OBGYN PGY-4

## 2025-04-03 NOTE — H&P
HISTORY AND PHYSICAL                                                OBSTETRICS          Subjective:       Aurora Pringle is a 44 y.o.  female with IUP at 38w0d weeks gestation who presented to the [facility] with elevated BP and was diagnosed with Pre E with SF (BP). Pt required procardia 10 x 1 for sustained severe range BP    Patient denies contractions, denies vaginal bleeding, denies LOF.   Fetal Movement: normal.    This IUP is complicated by Pre E with SF (BP), AMA, IVF pregnancy, Anxiety/Depression, GTP, EIF, Hypothyroid.    Review of Systems   Constitutional:  Negative for fever.   HENT:  Negative for nasal congestion.    Respiratory:  Negative for shortness of breath.    Cardiovascular:  Negative for chest pain.   Gastrointestinal:  Negative for abdominal pain, nausea and vomiting.   Genitourinary:  Negative for flank pain and pelvic pain.   Neurological:  Negative for headaches.       PMHx:   Past Medical History:   Diagnosis Date     product of IVF pregnancy        PSHx:   Past Surgical History:   Procedure Laterality Date    ADENOIDECTOMY      FERTILITY SURGERY  2017    egg retrieval    POLYPECTOMY         All:   Review of patient's allergies indicates:   Allergen Reactions    Fire ant Anaphylaxis    Surgical stainless steel Hives, Itching, Rash and Swelling    Nickel     Sulfa (sulfonamide antibiotics) Nausea And Vomiting       Meds: Prescriptions Prior to Admission[1]    SH: Social History[2]    FH:   Family History   Problem Relation Name Age of Onset    Breast cancer Maternal Grandmother      Ovarian cancer Neg Hx      Cervical cancer Neg Hx         OBHx:   OB History    Para Term  AB Living   1 0 0 0 0 0   SAB IAB Ectopic Multiple Live Births   0 0 0 0 0      # Outcome Date GA Lbr North/2nd Weight Sex Type Anes PTL Lv   1 Current                Objective:       BP (!) 147/89   Pulse (!) 59   Temp 98.4 °F (36.9 °C) (Oral)   Resp 18   LMP 2024   SpO2 98%      Vitals:    25 1300 25 1342 25 1359 25 1414   BP: (!) 164/92 (!) 183/98 (!) 140/83 (!) 147/89   Pulse: 66  (!) 57 (!) 59   Resp:       Temp:       TempSrc:       SpO2: 98%          General:   alert, appears stated age and cooperative, no apparent distress   HENT:  normocephalic, atraumatic   Eyes:  extraocular movements and conjunctivae normal   Neck:  supple, range of motion normal, no thyromegaly   Lungs:   no respiratory distress   Heart:   regular rate   Abdomen:  soft, non-tender, non-distended but gravid, no rebound or guarding    Extremities negative edema, negative erythema   FHT: 130, moderate BTBV, +accels, -decels;  Cat 1 (reassuring)                 TOCO: Q 3 minutes   Presentations: cephalic by ultrasound   Cervix:     Dilation 1    Effacement 60    Position -2               EFW by Leopold's: 7lbs    Recent Growth Scan:   @ 32w6d  Hernandez live IUP   Fetal size is appropriate for gestational age, with the EFW (1990 g) plotting at the 22% and the AC plotting at the 59%.   A limited repeat fetal anatomic survey appears normal.   The BPP score is normal at 8/8.   The MVP is normal.   cephalic presentation.     Lab Review  Blood Type O POS  GBBS: positive  Rubella: Immune  Trep NR  HIV: negative  HepB: negative       Assessment:       38w0d weeks gestation with Pre E with SF (IOL)     Active Hospital Problems    Diagnosis  POA    *Pre-eclampsia with severe features affecting childbirth [O14.94]  Unknown    Pregnancy conceived through ART (IVF) [Z38.2]  Yes     In Vitro Fertilization  The use of IVF has been associated with an increase in preeclampsia, gestational hypertension, placental abruption, placenta previa, and risk of  delivery. While the association between ART and these adverse outcomes raise some concern, it is important to note that the literature is contradictory on this subject and the chances of having a healthy child conceived with ART are overall  extremely high.    Recommendations:  Continue bASA for pre-eclampsia prevention  PNT, growth, and delivery timing per AMA        Multigravida of advanced maternal age in second trimester [O09.522]  Yes    Echogenic intracardiac focus of fetus on prenatal ultrasound [O28.3]  Yes      Resolved Hospital Problems   No resolved problems to display.          Plan:   IOL    Risks, benefits, alternatives and possible complications have been discussed in detail with the patient.   - Consents signed and to chart  - Admit to Labor and Delivery unit   - Epidural per Anesthesia  - Draw CBC, T&S  - Notify Staff  - Recheck in 4 hrs or PRN  - EFW 7  - Maternal pelvis never proven  -Fiore balloon and pitocin to begin IOL    Pre E with SF  -Sustained severe range BP which required procardia 10 x1  -Started on magnesium  -Pre E labs WNL; Cr 0.8  AST/ALT 25/21  P:Cr 0.1     AMA  -MT 21 negative   -Meets criteria for PP lovenox     GTP  -plts on admit 117  -anesthesia aware    IVF  -PGTA neg  -MT 21 neg    Anxiety/Depression  -home zoloft continued  -will need 2 week mood check     Hypothyroid  -home synthroid continued    Post-Partum Hemorrhage risk - medium    Lauren Ceja MD, MPH  OBGYN PGY-4         [1] (Not in a hospital admission)  [2]   Social History  Socioeconomic History    Marital status: Single   Tobacco Use    Smoking status: Never    Smokeless tobacco: Never   Substance and Sexual Activity    Alcohol use: Not Currently    Drug use: Never    Sexual activity: Not Currently     Birth control/protection: None     Social Drivers of Health     Financial Resource Strain: Low Risk  (7/10/2024)    Received from [facility]    Overall Financial Resource Strain (CARDIA)     Difficulty of Paying Living Expenses: Not hard at all   Food Insecurity: No Food Insecurity (7/10/2024)    Received from [facility]    Hunger Vital Sign     Worried About Running Out of Food in the Last Year: Never true     Ran Out of Food in the Last Year:  Never true   Transportation Needs: No Transportation Needs (7/10/2024)    Received from [facility]    PRAPARE - Transportation     Lack of Transportation (Medical): No     Lack of Transportation (Non-Medical): No   Physical Activity: Sufficiently Active (7/10/2024)    Received from [facility]    Exercise Vital Sign     Days of Exercise per Week: 5 days     Minutes of Exercise per Session: 40 min   Stress: Stress Concern Present (7/10/2024)    Received from [facility]    [place] [employer] - Occupational Stress Questionnaire     Feeling of Stress : Rather much   Housing Stability: Low Risk  (7/10/2024)    Received from [facility]    Housing Stability Vital Sign     Unable to Pay for Housing in the Last Year: No     Number of Places Lived in the Last Year: 2     Unstable Housing in the Last Year: No

## 2025-04-03 NOTE — ED PROVIDER NOTES
Encounter Date: 4/3/2025       History     Chief Complaint   Patient presents with    Hypertension     Aurora Pringle is a 44 y.o. F at 38w0d presents to the [facility] from prenatal testing where blood pressure noted to be elevated to 145/92.     Patient notes recent mild increase in SOB and LE swelling. She denies headache, chest pain or RUQ pain.   Patient denies contractions, denies vaginal bleeding, denies LOF.   Fetal Movement: normal    This IUP is complicated by IVF-pregnancy and AMA.        Review of patient's allergies indicates:   Allergen Reactions    Chlorhexidine Swelling, Rash and Blisters    Fire ant Anaphylaxis    Surgical stainless steel Hives, Itching, Rash and Swelling    Nickel     Sulfa (sulfonamide antibiotics) Nausea And Vomiting     Past Medical History:   Diagnosis Date    [place] product of IVF pregnancy      Past Surgical History:   Procedure Laterality Date    ADENOIDECTOMY       SECTION N/A 2025    Procedure:  SECTION;  Surgeon: Pravin Knowles MD;  Location: [facility];  Service: OB/GYN;  Laterality: N/A;    FERTILITY SURGERY      egg retrieval    POLYPECTOMY       Family History   Problem Relation Name Age of Onset    Breast cancer Maternal Grandmother      Ovarian cancer Neg Hx      Cervical cancer Neg Hx       Social History[1]  Review of Systems   Constitutional:  Negative for fever.   HENT:  Negative for sore throat.    Respiratory:  Negative for shortness of breath.    Cardiovascular:  Negative for chest pain.   Gastrointestinal:  Negative for nausea.   Genitourinary:  Negative for dysuria.   Musculoskeletal:  Negative for back pain.   Skin:  Negative for rash.   Neurological:  Negative for weakness.   Hematological:  Does not bruise/bleed easily.       Physical Exam     Initial Vitals   BP Pulse Resp Temp SpO2   25 1135 25 1140 25 1142 25 1140 25 1140   (!) 145/92 61 18 98.4 °F (36.9 °C) 98 %      MAP       --                 Physical Exam    Vitals reviewed.  Constitutional: She appears well-developed and well-nourished.   HENT:   Head: Normocephalic and atraumatic.   Eyes: EOM are normal.   Neck:   Normal range of motion.  Cardiovascular:  Normal rate.           Pulmonary/Chest: No respiratory distress.   Abdominal: Abdomen is soft. There is no abdominal tenderness.   Musculoskeletal:         General: Edema present. Normal range of motion.      Cervical back: Normal range of motion.     Neurological: She is oriented to person, place, and time.   Skin: Skin is warm and dry.   Psychiatric: She has a normal mood and affect.         ED Course   Obtain Fetal nonstress test (NST)    Date/Time: 4/3/2025 3:51 PM    Performed by: Maria Fernanda Lang MD  Authorized by: Lauren Ceja MD    Nonstress Test:     Variability:  6-25 BPM    Decelerations:  None    Accelerations:  15 bpm    Baseline:  150    Contractions:  Irregular  Biophysical Profile:     Nonstress Test Interpretation: reactive      Overall Impression:  Reassuring  Post-procedure:     Patient tolerance:  Patient tolerated the procedure well with no immediate complications    Labs Reviewed   PROTEIN / CREATININE RATIO, URINE - Abnormal       Result Value    Urine Creatinine 111.9      Urine Protein 21 (*)     Urine Protein/Creatinine Ratio 0.19     COMPREHENSIVE METABOLIC PANEL - Abnormal    Sodium 135 (*)     Potassium 4.5      Chloride 110      CO2 17 (*)     Glucose 72      BUN 16      Creatinine 0.8      Calcium 9.6      Protein Total 6.2      Albumin 2.8 (*)     Bilirubin Total 0.3       (*)     AST 25      ALT 21      Anion Gap 8      eGFR >60     CBC WITH DIFFERENTIAL - Abnormal    WBC 7.29      RBC 4.05      HGB 13.4      HCT 38.0      MCV 94      MCH 33.1 (*)     MCHC 35.3      RDW 13.2      Platelet Count 117 (*)     MPV 13.1 (*)     Nucleated RBC 0      Neut % 62.4      Lymph % 21.9      Mono % 10.7      Eos % 2.9      Basophil % 1.1      Imm Grans % 1.0 (*)      Neut # 4.55      Lymph # 1.60      Mono # 0.78      Eos # 0.21      Baso # 0.08      Imm Grans # 0.07 (*)    CBC W/ AUTO DIFFERENTIAL    Narrative:     The following orders were created for panel order CBC auto differential.  Procedure                               Abnormality         Status                     ---------                               -----------         ------                     CBC with Differential[[id number]]       Abnormal            Final result                 Please view results for these tests on the individual orders.        ECG Results    None       Imaging Results    None          Medications   labetaloL (NORMODYNE,TRANDATE) 5 mg/mL injection (has no administration in time range)   ketorolac injection 30 mg (30 mg Intravenous Given 4/5/25 1118)   oxyCODONE immediate release tablet 5 mg (has no administration in time range)   oxyCODONE immediate release tablet Tab 10 mg (has no administration in time range)   ondansetron injection 4 mg (has no administration in time range)   magnesium sulfate in water 40 gram/1,000 mL (4 %) infusion (0 g/hr Intravenous Stopped 4/5/25 1301)   NIFEdipine capsule 10 mg (10 mg Oral Given 4/3/25 1342)   penicillin G potassium 5 Million Units in D5W 100 mL IVPB (MB+) (5 Million Units Intravenous New Bag 4/3/25 1711)   azithromycin (ZITHROMAX) 500 mg in 0.9% NaCl 250 mL IVPB (admixture device) (500 mg Intravenous New Bag 4/4/25 1210)   magnesium sulfate in water 40 gram/1,000 mL (4 %) bolus from bag 4 g 100 mL (4 g Intravenous Bolus from Bag 4/3/25 1536)   labetaloL injection 20 mg (20 mg Intravenous Given 4/3/25 1817)   fentanyl 2mcg/mL with BUPivacaine 0.1% in sdoium chloride 0.9% Epidural 2 mcg/mL- 0.1 % Soln (  Override pull for Anesthesia 4/4/25 0043)   sodium citrate-citric acid 500-334 mg/5 ml solution 30 mL (30 mLs Oral Given 4/4/25 1209)   famotidine (PF) injection 20 mg (20 mg Intravenous Given 4/4/25 1208)   miSOPROStoL tablet 800 mcg (800 mcg Rectal  Given 25 1340)   diphenoxylate-atropine 2.5-0.025 mg per tablet 1 tablet (1 tablet Oral Given 25 1427)   hydrALAZINE injection 5 mg (5 mg Intravenous Given 25 1458)   hydrALAZINE injection 5 mg (5 mg Intravenous Given 25 1528)   hydrALAZINE injection 5 mg (5 mg Intravenous Given 25 1744)   hydrALAZINE injection 10 mg (10 mg Intravenous Given 25 1822)   methylPREDNISolone sod suc(PF) injection 125 mg (125 mg Intramuscular Given 25 2156)   NIFEdipine 24 hr tablet 30 mg (30 mg Oral Given 25 1255)   NIFEdipine capsule 10 mg (10 mg Oral Given 25 1654)     Medical Decision Making  Aurora Pringle is a 44 y.o. F at 38w0d presents to the [facility] from prenatal testing where blood pressure noted to be elevated to 145/92.       Temp:  [98.4 °F (36.9 °C)] 98.4 °F (36.9 °C)  Pulse:  [38-87] 62  Resp:  [18] 18  SpO2:  [94 %-100 %] 97 %  BP: ()/() 134/78     gHTN  - Patient was seen today in the [facility] for elevated blood pressures.  - Sent in from [facility]   - Symptoms include: SOB, LE swelling.   - Pressures in the [facility]:  multiple mild and severe-range pressures  - Sustained severe BPs requiring PO procardia 10  - Pre-E labs as follows: plt 117 AST/ALT 25/21 Cr 0.8 PCR 0.19  -Given patient 38 weeks with pre-eclampsia and severe range BP, counseled on recommendation for IOL and Magnesium   - Patient initially hesitant to agree to IOL, eventually agreed to IOL  - US: vtx, consents in chart, staff updated  - Will send to L&D for IOL     Maria Fernanda Lang MD  OBGYN   PGY-1        Amount and/or Complexity of Data Reviewed  Labs: ordered.    Risk  Prescription drug management.              Attending Attestation:   Physician Attestation Statement for Resident:  As the supervising MD   Physician Attestation Statement: I have personally seen and examined this patient.   I agree with the above history.  -:   As the supervising MD I agree with the above PE.     As the supervising MD I agree with the  above treatment, course, plan, and disposition.   I was personally present during the critical portions of the procedure(s) performed by the resident and was immediately available in the ED to provide services and assistance as needed during the entire procedure.  I have reviewed and agree with the residents interpretation of the following: lab data.  I have reviewed the following: old records at this facility.            Attending ED Notes:   I saw and examined the patient myself along with the resident. I have personally reviewed pertinent prior records, labs, imaging, and procedures. I have reviewed the documentation and agree with the findings and plan as documented.      at 38w0d presenting with mild range BP in PNT. Mild to severe range BPs in [facility], including persistent severe range BPs requiring PO Procardia 10mg IR. Pre-eclampsia labs without evidence of end-organ damage. NST reactive and reassuring, tocometry with irregular contractions. Meets criteria for pre-eclampsia with severe features, recommend admission for Magnesium for seizure prophylaxis and IOL. Patient initially hesitant but ultimately agreed to admission.    Natasha Greer MD FACOG  OB Hospitalist                                 Clinical Impression:  Final diagnoses:  [Z3A.38] 38 weeks gestation of pregnancy  [O14.93] Pre-eclampsia in third trimester          ED Disposition Condition    Send to L&D Stable                  Maria Fernanda Lang MD  Resident  [phone]         [1]   Social History  Tobacco Use    Smoking status: Never    Smokeless tobacco: Never   Substance Use Topics    Alcohol use: Not Currently    Drug use: Never        Natasha Greer MD  04/10/25 5980

## 2025-04-04 PROBLEM — Z98.891 S/P PRIMARY LOW TRANSVERSE C-SECTION: Status: ACTIVE | Noted: 2025-04-04

## 2025-04-04 PROBLEM — R00.1 BRADYCARDIA: Status: ACTIVE | Noted: 2025-04-04

## 2025-04-04 PROBLEM — F41.9 ANXIETY: Status: ACTIVE | Noted: 2025-04-04

## 2025-04-04 PROBLEM — E03.9 HYPOTHYROID: Status: ACTIVE | Noted: 2025-04-04

## 2025-04-04 PROBLEM — O99.113 BENIGN GESTATIONAL THROMBOCYTOPENIA IN THIRD TRIMESTER: Status: ACTIVE | Noted: 2025-04-04

## 2025-04-04 PROBLEM — D69.6 BENIGN GESTATIONAL THROMBOCYTOPENIA IN THIRD TRIMESTER: Status: ACTIVE | Noted: 2025-04-04

## 2025-04-04 LAB
HBV SURFACE AG SERPL QL IA: NORMAL
MAGNESIUM SERPL-MCNC: 7.1 MG/DL (ref 1.6–2.6)
OHS QRS DURATION: 84 MS
OHS QTC CALCULATION: 394 MS

## 2025-04-04 PROCEDURE — 25000003 PHARM REV CODE 250

## 2025-04-04 PROCEDURE — 59510 CESAREAN DELIVERY: CPT | Mod: AT,,, | Performed by: STUDENT IN AN ORGANIZED HEALTH CARE EDUCATION/TRAINING PROGRAM

## 2025-04-04 PROCEDURE — 25000003 PHARM REV CODE 250: Performed by: STUDENT IN AN ORGANIZED HEALTH CARE EDUCATION/TRAINING PROGRAM

## 2025-04-04 PROCEDURE — 36004724 HC OB OR TIME LEV III - 1ST 15 MIN: Performed by: STUDENT IN AN ORGANIZED HEALTH CARE EDUCATION/TRAINING PROGRAM

## 2025-04-04 PROCEDURE — 11000001 HC ACUTE MED/SURG PRIVATE ROOM

## 2025-04-04 PROCEDURE — 71000039 HC RECOVERY, EACH ADD'L HOUR: Performed by: STUDENT IN AN ORGANIZED HEALTH CARE EDUCATION/TRAINING PROGRAM

## 2025-04-04 PROCEDURE — 83735 ASSAY OF MAGNESIUM: CPT

## 2025-04-04 PROCEDURE — 37000008 HC ANESTHESIA 1ST 15 MINUTES: Performed by: STUDENT IN AN ORGANIZED HEALTH CARE EDUCATION/TRAINING PROGRAM

## 2025-04-04 PROCEDURE — 93010 ELECTROCARDIOGRAM REPORT: CPT | Mod: ,,, | Performed by: INTERNAL MEDICINE

## 2025-04-04 PROCEDURE — 63600175 PHARM REV CODE 636 W HCPCS

## 2025-04-04 PROCEDURE — 62326 NJX INTERLAMINAR LMBR/SAC: CPT

## 2025-04-04 PROCEDURE — 71000033 HC RECOVERY, INTIAL HOUR: Performed by: STUDENT IN AN ORGANIZED HEALTH CARE EDUCATION/TRAINING PROGRAM

## 2025-04-04 PROCEDURE — 51702 INSERT TEMP BLADDER CATH: CPT

## 2025-04-04 PROCEDURE — 36004725 HC OB OR TIME LEV III - EA ADD 15 MIN: Performed by: STUDENT IN AN ORGANIZED HEALTH CARE EDUCATION/TRAINING PROGRAM

## 2025-04-04 PROCEDURE — 37000009 HC ANESTHESIA EA ADD 15 MINS: Performed by: STUDENT IN AN ORGANIZED HEALTH CARE EDUCATION/TRAINING PROGRAM

## 2025-04-04 PROCEDURE — 10907ZC DRAINAGE OF AMNIOTIC FLUID, THERAPEUTIC FROM PRODUCTS OF CONCEPTION, VIA NATURAL OR ARTIFICIAL OPENING: ICD-10-PCS | Performed by: STUDENT IN AN ORGANIZED HEALTH CARE EDUCATION/TRAINING PROGRAM

## 2025-04-04 PROCEDURE — 36415 COLL VENOUS BLD VENIPUNCTURE: CPT

## 2025-04-04 PROCEDURE — 4A0HXCZ MEASUREMENT OF PRODUCTS OF CONCEPTION, CARDIAC RATE, EXTERNAL APPROACH: ICD-10-PCS | Performed by: STUDENT IN AN ORGANIZED HEALTH CARE EDUCATION/TRAINING PROGRAM

## 2025-04-04 PROCEDURE — 63600175 PHARM REV CODE 636 W HCPCS: Mod: JZ,TB

## 2025-04-04 PROCEDURE — 93005 ELECTROCARDIOGRAM TRACING: CPT

## 2025-04-04 PROCEDURE — 27001000 HC PILLOW, VAGINAL FETAL DEVICE

## 2025-04-04 RX ORDER — CHLOROPROCAINE HYDROCHLORIDE 30 MG/ML
INJECTION, SOLUTION EPIDURAL; INFILTRATION; INTRACAUDAL; PERINEURAL
Status: DISCONTINUED | OUTPATIENT
Start: 2025-04-04 | End: 2025-04-04

## 2025-04-04 RX ORDER — MISOPROSTOL 200 UG/1
800 TABLET ORAL ONCE AS NEEDED
Status: DISCONTINUED | OUTPATIENT
Start: 2025-04-04 | End: 2025-04-04

## 2025-04-04 RX ORDER — ADHESIVE BANDAGE
30 BANDAGE TOPICAL 2 TIMES DAILY PRN
Status: DISCONTINUED | OUTPATIENT
Start: 2025-04-05 | End: 2025-04-06

## 2025-04-04 RX ORDER — DIPHENOXYLATE HYDROCHLORIDE AND ATROPINE SULFATE 2.5; .025 MG/1; MG/1
1 TABLET ORAL ONCE
Status: COMPLETED | OUTPATIENT
Start: 2025-04-04 | End: 2025-04-04

## 2025-04-04 RX ORDER — DOCUSATE SODIUM 100 MG/1
200 CAPSULE, LIQUID FILLED ORAL 2 TIMES DAILY
Status: DISCONTINUED | OUTPATIENT
Start: 2025-04-04 | End: 2025-04-08 | Stop reason: HOSPADM

## 2025-04-04 RX ORDER — OXYTOCIN-SODIUM CHLORIDE 0.9% IV SOLN 30 UNIT/500ML 30-0.9/5 UT/ML-%
95 SOLUTION INTRAVENOUS CONTINUOUS PRN
Status: DISCONTINUED | OUTPATIENT
Start: 2025-04-04 | End: 2025-04-04

## 2025-04-04 RX ORDER — BISACODYL 10 MG/1
10 SUPPOSITORY RECTAL ONCE AS NEEDED
Status: DISCONTINUED | OUTPATIENT
Start: 2025-04-04 | End: 2025-04-04

## 2025-04-04 RX ORDER — ONDANSETRON 8 MG/1
8 TABLET, ORALLY DISINTEGRATING ORAL EVERY 8 HOURS PRN
Status: DISCONTINUED | OUTPATIENT
Start: 2025-04-04 | End: 2025-04-04

## 2025-04-04 RX ORDER — DIPHENHYDRAMINE HCL 25 MG
25 CAPSULE ORAL EVERY 4 HOURS PRN
Status: DISCONTINUED | OUTPATIENT
Start: 2025-04-04 | End: 2025-04-04

## 2025-04-04 RX ORDER — TRANEXAMIC ACID 10 MG/ML
1000 INJECTION, SOLUTION INTRAVENOUS EVERY 30 MIN PRN
Status: DISCONTINUED | OUTPATIENT
Start: 2025-04-04 | End: 2025-04-08 | Stop reason: HOSPADM

## 2025-04-04 RX ORDER — OXYCODONE AND ACETAMINOPHEN 5; 325 MG/1; MG/1
1 TABLET ORAL EVERY 4 HOURS PRN
Status: DISCONTINUED | OUTPATIENT
Start: 2025-04-04 | End: 2025-04-04

## 2025-04-04 RX ORDER — AMOXICILLIN 250 MG
1 CAPSULE ORAL NIGHTLY PRN
Status: DISCONTINUED | OUTPATIENT
Start: 2025-04-04 | End: 2025-04-04

## 2025-04-04 RX ORDER — PRENATAL WITH FERROUS FUM AND FOLIC ACID 3080; 920; 120; 400; 22; 1.84; 3; 20; 10; 1; 12; 200; 27; 25; 2 [IU]/1; [IU]/1; MG/1; [IU]/1; MG/1; MG/1; MG/1; MG/1; MG/1; MG/1; UG/1; MG/1; MG/1; MG/1; MG/1
1 TABLET ORAL DAILY
Status: DISCONTINUED | OUTPATIENT
Start: 2025-04-04 | End: 2025-04-04

## 2025-04-04 RX ORDER — HYDRALAZINE HYDROCHLORIDE 20 MG/ML
5 INJECTION INTRAMUSCULAR; INTRAVENOUS ONCE
Status: COMPLETED | OUTPATIENT
Start: 2025-04-04 | End: 2025-04-04

## 2025-04-04 RX ORDER — ENOXAPARIN SODIUM 100 MG/ML
40 INJECTION SUBCUTANEOUS EVERY 24 HOURS
Status: DISCONTINUED | OUTPATIENT
Start: 2025-04-05 | End: 2025-04-08 | Stop reason: HOSPADM

## 2025-04-04 RX ORDER — SODIUM CHLORIDE 0.9 % (FLUSH) 0.9 %
10 SYRINGE (ML) INJECTION
Status: DISCONTINUED | OUTPATIENT
Start: 2025-04-04 | End: 2025-04-08 | Stop reason: HOSPADM

## 2025-04-04 RX ORDER — OXYCODONE AND ACETAMINOPHEN 10; 325 MG/1; MG/1
1 TABLET ORAL EVERY 4 HOURS PRN
Status: DISCONTINUED | OUTPATIENT
Start: 2025-04-04 | End: 2025-04-04

## 2025-04-04 RX ORDER — SERTRALINE HYDROCHLORIDE 25 MG/1
25 TABLET, FILM COATED ORAL DAILY
Status: DISCONTINUED | OUTPATIENT
Start: 2025-04-05 | End: 2025-04-08 | Stop reason: HOSPADM

## 2025-04-04 RX ORDER — NIFEDIPINE 30 MG/1
30 TABLET, EXTENDED RELEASE ORAL NIGHTLY
Status: DISCONTINUED | OUTPATIENT
Start: 2025-04-04 | End: 2025-04-05

## 2025-04-04 RX ORDER — FAMOTIDINE 10 MG/ML
20 INJECTION, SOLUTION INTRAVENOUS ONCE
Status: COMPLETED | OUTPATIENT
Start: 2025-04-04 | End: 2025-04-04

## 2025-04-04 RX ORDER — ONDANSETRON HYDROCHLORIDE 2 MG/ML
4 INJECTION, SOLUTION INTRAVENOUS EVERY 6 HOURS PRN
Status: ACTIVE | OUTPATIENT
Start: 2025-04-04 | End: 2025-04-05

## 2025-04-04 RX ORDER — MISOPROSTOL 200 UG/1
800 TABLET ORAL ONCE AS NEEDED
Status: DISCONTINUED | OUTPATIENT
Start: 2025-04-04 | End: 2025-04-08 | Stop reason: HOSPADM

## 2025-04-04 RX ORDER — SIMETHICONE 80 MG
1 TABLET,CHEWABLE ORAL EVERY 6 HOURS PRN
Status: DISCONTINUED | OUTPATIENT
Start: 2025-04-04 | End: 2025-04-04

## 2025-04-04 RX ORDER — PROCHLORPERAZINE EDISYLATE 5 MG/ML
INJECTION INTRAMUSCULAR; INTRAVENOUS
Status: DISCONTINUED | OUTPATIENT
Start: 2025-04-04 | End: 2025-04-04

## 2025-04-04 RX ORDER — DIPHENOXYLATE HYDROCHLORIDE AND ATROPINE SULFATE 2.5; .025 MG/1; MG/1
2 TABLET ORAL EVERY 6 HOURS PRN
Status: DISCONTINUED | OUTPATIENT
Start: 2025-04-04 | End: 2025-04-08 | Stop reason: HOSPADM

## 2025-04-04 RX ORDER — DEXAMETHASONE SODIUM PHOSPHATE 4 MG/ML
INJECTION, SOLUTION INTRA-ARTICULAR; INTRALESIONAL; INTRAMUSCULAR; INTRAVENOUS; SOFT TISSUE
Status: DISCONTINUED | OUTPATIENT
Start: 2025-04-04 | End: 2025-04-04

## 2025-04-04 RX ORDER — MAGNESIUM SULFATE HEPTAHYDRATE 40 MG/ML
2 INJECTION, SOLUTION INTRAVENOUS CONTINUOUS
Status: DISPENSED | OUTPATIENT
Start: 2025-04-04 | End: 2025-04-05

## 2025-04-04 RX ORDER — MAGNESIUM SULFATE HEPTAHYDRATE 40 MG/ML
2 INJECTION, SOLUTION INTRAVENOUS CONTINUOUS
Status: DISCONTINUED | OUTPATIENT
Start: 2025-04-04 | End: 2025-04-04

## 2025-04-04 RX ORDER — MAGNESIUM SULFATE HEPTAHYDRATE 40 MG/ML
2 INJECTION, SOLUTION INTRAVENOUS CONTINUOUS
Status: DISCONTINUED | OUTPATIENT
Start: 2025-04-05 | End: 2025-04-04

## 2025-04-04 RX ORDER — KETOROLAC TROMETHAMINE 30 MG/ML
INJECTION, SOLUTION INTRAMUSCULAR; INTRAVENOUS
Status: DISCONTINUED | OUTPATIENT
Start: 2025-04-04 | End: 2025-04-04

## 2025-04-04 RX ORDER — AMOXICILLIN 250 MG
1 CAPSULE ORAL NIGHTLY PRN
Status: DISCONTINUED | OUTPATIENT
Start: 2025-04-04 | End: 2025-04-08 | Stop reason: HOSPADM

## 2025-04-04 RX ORDER — ENOXAPARIN SODIUM 100 MG/ML
40 INJECTION SUBCUTANEOUS EVERY 24 HOURS
Status: DISCONTINUED | OUTPATIENT
Start: 2025-04-05 | End: 2025-04-04

## 2025-04-04 RX ORDER — TRANEXAMIC ACID 10 MG/ML
1000 INJECTION, SOLUTION INTRAVENOUS EVERY 30 MIN PRN
Status: DISCONTINUED | OUTPATIENT
Start: 2025-04-04 | End: 2025-04-04

## 2025-04-04 RX ORDER — BISACODYL 10 MG/1
10 SUPPOSITORY RECTAL ONCE AS NEEDED
Status: DISCONTINUED | OUTPATIENT
Start: 2025-04-04 | End: 2025-04-08 | Stop reason: HOSPADM

## 2025-04-04 RX ORDER — METHYLERGONOVINE MALEATE 0.2 MG/ML
200 INJECTION INTRAVENOUS ONCE AS NEEDED
Status: DISCONTINUED | OUTPATIENT
Start: 2025-04-04 | End: 2025-04-04

## 2025-04-04 RX ORDER — SODIUM CITRATE AND CITRIC ACID MONOHYDRATE 334; 500 MG/5ML; MG/5ML
30 SOLUTION ORAL ONCE
Status: COMPLETED | OUTPATIENT
Start: 2025-04-04 | End: 2025-04-04

## 2025-04-04 RX ORDER — CARBOPROST TROMETHAMINE 250 UG/ML
250 INJECTION, SOLUTION INTRAMUSCULAR
Status: DISCONTINUED | OUTPATIENT
Start: 2025-04-04 | End: 2025-04-04

## 2025-04-04 RX ORDER — KETOROLAC TROMETHAMINE 30 MG/ML
30 INJECTION, SOLUTION INTRAMUSCULAR; INTRAVENOUS EVERY 6 HOURS
Status: DISPENSED | OUTPATIENT
Start: 2025-04-04 | End: 2025-04-05

## 2025-04-04 RX ORDER — IBUPROFEN 400 MG/1
800 TABLET ORAL EVERY 8 HOURS
Status: DISCONTINUED | OUTPATIENT
Start: 2025-04-05 | End: 2025-04-08 | Stop reason: HOSPADM

## 2025-04-04 RX ORDER — PROCHLORPERAZINE EDISYLATE 5 MG/ML
5 INJECTION INTRAMUSCULAR; INTRAVENOUS EVERY 6 HOURS PRN
Status: DISCONTINUED | OUTPATIENT
Start: 2025-04-04 | End: 2025-04-06

## 2025-04-04 RX ORDER — FENTANYL/BUPIVACAINE/NS/PF 2MCG/ML-.1
PLASTIC BAG, INJECTION (ML) INJECTION
Status: DISCONTINUED | OUTPATIENT
Start: 2025-04-03 | End: 2025-04-04

## 2025-04-04 RX ORDER — SERTRALINE HYDROCHLORIDE 25 MG/1
25 TABLET, FILM COATED ORAL DAILY
Status: DISCONTINUED | OUTPATIENT
Start: 2025-04-04 | End: 2025-04-04

## 2025-04-04 RX ORDER — HYDRALAZINE HYDROCHLORIDE 20 MG/ML
10 INJECTION INTRAMUSCULAR; INTRAVENOUS ONCE
Status: COMPLETED | OUTPATIENT
Start: 2025-04-04 | End: 2025-04-04

## 2025-04-04 RX ORDER — LEVOTHYROXINE SODIUM 25 UG/1
50 TABLET ORAL
Status: DISCONTINUED | OUTPATIENT
Start: 2025-04-04 | End: 2025-04-04

## 2025-04-04 RX ORDER — FENTANYL/BUPIVACAINE/NS/PF 2MCG/ML-.1
PLASTIC BAG, INJECTION (ML) INJECTION CONTINUOUS
Status: DISCONTINUED | OUTPATIENT
Start: 2025-04-04 | End: 2025-04-04

## 2025-04-04 RX ORDER — DIPHENHYDRAMINE HCL 25 MG
25 CAPSULE ORAL EVERY 4 HOURS PRN
Status: DISCONTINUED | OUTPATIENT
Start: 2025-04-04 | End: 2025-04-06

## 2025-04-04 RX ORDER — PRENATAL WITH FERROUS FUM AND FOLIC ACID 3080; 920; 120; 400; 22; 1.84; 3; 20; 10; 1; 12; 200; 27; 25; 2 [IU]/1; [IU]/1; MG/1; [IU]/1; MG/1; MG/1; MG/1; MG/1; MG/1; MG/1; UG/1; MG/1; MG/1; MG/1; MG/1
1 TABLET ORAL DAILY
Status: DISCONTINUED | OUTPATIENT
Start: 2025-04-04 | End: 2025-04-08 | Stop reason: HOSPADM

## 2025-04-04 RX ORDER — OXYTOCIN-SODIUM CHLORIDE 0.9% IV SOLN 30 UNIT/500ML 30-0.9/5 UT/ML-%
95 SOLUTION INTRAVENOUS ONCE AS NEEDED
Status: DISCONTINUED | OUTPATIENT
Start: 2025-04-04 | End: 2025-04-04

## 2025-04-04 RX ORDER — ADHESIVE BANDAGE
30 BANDAGE TOPICAL 2 TIMES DAILY PRN
Status: DISCONTINUED | OUTPATIENT
Start: 2025-04-05 | End: 2025-04-08 | Stop reason: HOSPADM

## 2025-04-04 RX ORDER — DOCUSATE SODIUM 100 MG/1
200 CAPSULE, LIQUID FILLED ORAL 2 TIMES DAILY
Status: DISCONTINUED | OUTPATIENT
Start: 2025-04-04 | End: 2025-04-04

## 2025-04-04 RX ORDER — METHYLERGONOVINE MALEATE 0.2 MG/ML
200 INJECTION INTRAVENOUS ONCE AS NEEDED
Status: DISCONTINUED | OUTPATIENT
Start: 2025-04-04 | End: 2025-04-08 | Stop reason: HOSPADM

## 2025-04-04 RX ORDER — DIPHENOXYLATE HYDROCHLORIDE AND ATROPINE SULFATE 2.5; .025 MG/1; MG/1
2 TABLET ORAL EVERY 6 HOURS PRN
Status: DISCONTINUED | OUTPATIENT
Start: 2025-04-04 | End: 2025-04-04

## 2025-04-04 RX ORDER — SODIUM CHLORIDE, SODIUM LACTATE, POTASSIUM CHLORIDE, CALCIUM CHLORIDE 600; 310; 30; 20 MG/100ML; MG/100ML; MG/100ML; MG/100ML
INJECTION, SOLUTION INTRAVENOUS CONTINUOUS PRN
Status: DISCONTINUED | OUTPATIENT
Start: 2025-04-04 | End: 2025-04-04

## 2025-04-04 RX ORDER — OXYTOCIN 10 [USP'U]/ML
INJECTION, SOLUTION INTRAMUSCULAR; INTRAVENOUS
Status: DISCONTINUED | OUTPATIENT
Start: 2025-04-04 | End: 2025-04-04

## 2025-04-04 RX ORDER — OXYTOCIN-SODIUM CHLORIDE 0.9% IV SOLN 30 UNIT/500ML 30-0.9/5 UT/ML-%
10 SOLUTION INTRAVENOUS ONCE AS NEEDED
Status: DISCONTINUED | OUTPATIENT
Start: 2025-04-04 | End: 2025-04-06

## 2025-04-04 RX ORDER — OXYCODONE HYDROCHLORIDE 10 MG/1
10 TABLET ORAL EVERY 4 HOURS PRN
Status: ACTIVE | OUTPATIENT
Start: 2025-04-04 | End: 2025-04-05

## 2025-04-04 RX ORDER — ONDANSETRON HYDROCHLORIDE 2 MG/ML
INJECTION, SOLUTION INTRAMUSCULAR; INTRAVENOUS
Status: DISCONTINUED | OUTPATIENT
Start: 2025-04-04 | End: 2025-04-04

## 2025-04-04 RX ORDER — MORPHINE SULFATE 0.5 MG/ML
INJECTION, SOLUTION EPIDURAL; INTRATHECAL; INTRAVENOUS
Status: DISCONTINUED | OUTPATIENT
Start: 2025-04-04 | End: 2025-04-04

## 2025-04-04 RX ORDER — OXYTOCIN 10 [USP'U]/ML
10 INJECTION, SOLUTION INTRAMUSCULAR; INTRAVENOUS ONCE AS NEEDED
Status: DISCONTINUED | OUTPATIENT
Start: 2025-04-04 | End: 2025-04-04

## 2025-04-04 RX ORDER — OXYCODONE HYDROCHLORIDE 5 MG/1
5 TABLET ORAL EVERY 4 HOURS PRN
Status: ACTIVE | OUTPATIENT
Start: 2025-04-04 | End: 2025-04-05

## 2025-04-04 RX ORDER — CARBOPROST TROMETHAMINE 250 UG/ML
250 INJECTION, SOLUTION INTRAMUSCULAR
Status: DISCONTINUED | OUTPATIENT
Start: 2025-04-04 | End: 2025-04-08 | Stop reason: HOSPADM

## 2025-04-04 RX ORDER — MISOPROSTOL 200 UG/1
800 TABLET ORAL ONCE AS NEEDED
Status: COMPLETED | OUTPATIENT
Start: 2025-04-04 | End: 2025-04-04

## 2025-04-04 RX ORDER — LIDOCAINE HYDROCHLORIDE AND EPINEPHRINE 15; 5 MG/ML; UG/ML
INJECTION, SOLUTION EPIDURAL
Status: DISCONTINUED | OUTPATIENT
Start: 2025-04-03 | End: 2025-04-04

## 2025-04-04 RX ORDER — PHENYLEPHRINE HYDROCHLORIDE 10 MG/ML
INJECTION INTRAVENOUS
Status: DISCONTINUED | OUTPATIENT
Start: 2025-04-04 | End: 2025-04-04

## 2025-04-04 RX ORDER — EPHEDRINE SULFATE 50 MG/ML
INJECTION, SOLUTION INTRAVENOUS
Status: DISCONTINUED | OUTPATIENT
Start: 2025-04-04 | End: 2025-04-04

## 2025-04-04 RX ORDER — SODIUM CHLORIDE 0.9 % (FLUSH) 0.9 %
10 SYRINGE (ML) INJECTION
Status: DISCONTINUED | OUTPATIENT
Start: 2025-04-04 | End: 2025-04-04

## 2025-04-04 RX ORDER — OXYTOCIN 10 [USP'U]/ML
10 INJECTION, SOLUTION INTRAMUSCULAR; INTRAVENOUS ONCE AS NEEDED
Status: DISCONTINUED | OUTPATIENT
Start: 2025-04-04 | End: 2025-04-08 | Stop reason: HOSPADM

## 2025-04-04 RX ORDER — FENTANYL CITRATE 50 UG/ML
INJECTION, SOLUTION INTRAMUSCULAR; INTRAVENOUS
Status: DISCONTINUED | OUTPATIENT
Start: 2025-04-04 | End: 2025-04-04

## 2025-04-04 RX ORDER — OXYTOCIN-SODIUM CHLORIDE 0.9% IV SOLN 30 UNIT/500ML 30-0.9/5 UT/ML-%
10 SOLUTION INTRAVENOUS ONCE AS NEEDED
Status: DISCONTINUED | OUTPATIENT
Start: 2025-04-04 | End: 2025-04-08 | Stop reason: HOSPADM

## 2025-04-04 RX ADMIN — OXYTOCIN 3 UNITS: 10 INJECTION, SOLUTION INTRAMUSCULAR; INTRAVENOUS at 01:04

## 2025-04-04 RX ADMIN — CARBOPROST TROMETHAMINE 250 MCG: 250 INJECTION, SOLUTION INTRAMUSCULAR at 01:04

## 2025-04-04 RX ADMIN — DIPHENOXYLATE HYDROCHLORIDE AND ATROPINE SULFATE 1 TABLET: 2.5; .025 TABLET ORAL at 02:04

## 2025-04-04 RX ADMIN — SODIUM CITRATE AND CITRIC ACID MONOHYDRATE 30 ML: 500; 334 SOLUTION ORAL at 12:04

## 2025-04-04 RX ADMIN — HYDRALAZINE HYDROCHLORIDE 10 MG: 20 INJECTION INTRAMUSCULAR; INTRAVENOUS at 06:04

## 2025-04-04 RX ADMIN — FENTANYL CITRATE 100 MCG: 50 INJECTION, SOLUTION INTRAMUSCULAR; INTRAVENOUS at 12:04

## 2025-04-04 RX ADMIN — KETOROLAC TROMETHAMINE 30 MG: 30 INJECTION, SOLUTION INTRAMUSCULAR; INTRAVENOUS at 08:04

## 2025-04-04 RX ADMIN — CHLOROPROCAINE HYDROCHLORIDE 5 ML: 30 INJECTION, SOLUTION EPIDURAL; INFILTRATION; INTRACAUDAL; PERINEURAL at 12:04

## 2025-04-04 RX ADMIN — PENICILLIN G POTASSIUM 3 MILLION UNITS: 20000000 INJECTION, POWDER, FOR SOLUTION INTRAMUSCULAR; INTRAVENOUS at 05:04

## 2025-04-04 RX ADMIN — CHLOROPROCAINE HYDROCHLORIDE 10 ML: 30 INJECTION, SOLUTION EPIDURAL; INFILTRATION; INTRACAUDAL; PERINEURAL at 12:04

## 2025-04-04 RX ADMIN — AZITHROMYCIN MONOHYDRATE 500 MG: 500 INJECTION, POWDER, LYOPHILIZED, FOR SOLUTION INTRAVENOUS at 12:04

## 2025-04-04 RX ADMIN — EPHEDRINE SULFATE 10 MG: 50 INJECTION INTRAVENOUS at 04:04

## 2025-04-04 RX ADMIN — PHENYLEPHRINE HYDROCHLORIDE 100 MCG: 10 INJECTION INTRAVENOUS at 01:04

## 2025-04-04 RX ADMIN — NIFEDIPINE 30 MG: 30 TABLET, FILM COATED, EXTENDED RELEASE ORAL at 08:04

## 2025-04-04 RX ADMIN — SODIUM CHLORIDE, POTASSIUM CHLORIDE, SODIUM LACTATE AND CALCIUM CHLORIDE: 600; 310; 30; 20 INJECTION, SOLUTION INTRAVENOUS at 08:04

## 2025-04-04 RX ADMIN — MAGNESIUM SULFATE HEPTAHYDRATE 2 G/HR: 40 INJECTION, SOLUTION INTRAVENOUS at 08:04

## 2025-04-04 RX ADMIN — LEVOTHYROXINE SODIUM 50 MCG: 25 TABLET ORAL at 08:04

## 2025-04-04 RX ADMIN — MISOPROSTOL 800 MCG: 200 TABLET ORAL at 01:04

## 2025-04-04 RX ADMIN — DEXTROSE 2 G: 50 INJECTION, SOLUTION INTRAVENOUS at 12:04

## 2025-04-04 RX ADMIN — DEXAMETHASONE SODIUM PHOSPHATE 4 MG: 4 INJECTION, SOLUTION INTRAMUSCULAR; INTRAVENOUS at 12:04

## 2025-04-04 RX ADMIN — OXYTOCIN 3 UNITS: 10 INJECTION, SOLUTION INTRAMUSCULAR; INTRAVENOUS at 12:04

## 2025-04-04 RX ADMIN — HYDRALAZINE HYDROCHLORIDE 5 MG: 20 INJECTION INTRAMUSCULAR; INTRAVENOUS at 02:04

## 2025-04-04 RX ADMIN — FAMOTIDINE 20 MG: 10 INJECTION, SOLUTION INTRAVENOUS at 12:04

## 2025-04-04 RX ADMIN — KETOROLAC TROMETHAMINE 30 MG: 30 INJECTION, SOLUTION INTRAMUSCULAR; INTRAVENOUS at 01:04

## 2025-04-04 RX ADMIN — ONDANSETRON 4 MG: 2 INJECTION INTRAMUSCULAR; INTRAVENOUS at 12:04

## 2025-04-04 RX ADMIN — ONDANSETRON 8 MG: 8 TABLET, ORALLY DISINTEGRATING ORAL at 07:04

## 2025-04-04 RX ADMIN — PHENYLEPHRINE HYDROCHLORIDE 100 MCG: 10 INJECTION INTRAVENOUS at 12:04

## 2025-04-04 RX ADMIN — HYDRALAZINE HYDROCHLORIDE 5 MG: 20 INJECTION INTRAMUSCULAR; INTRAVENOUS at 05:04

## 2025-04-04 RX ADMIN — SERTRALINE HYDROCHLORIDE 25 MG: 25 TABLET ORAL at 08:04

## 2025-04-04 RX ADMIN — PENICILLIN G POTASSIUM 3 MILLION UNITS: 20000000 INJECTION, POWDER, FOR SOLUTION INTRAMUSCULAR; INTRAVENOUS at 01:04

## 2025-04-04 RX ADMIN — MAGNESIUM SULFATE HEPTAHYDRATE 2 G/HR: 40 INJECTION, SOLUTION INTRAVENOUS at 06:04

## 2025-04-04 RX ADMIN — OXYTOCIN 12 UNITS: 10 INJECTION, SOLUTION INTRAMUSCULAR; INTRAVENOUS at 01:04

## 2025-04-04 RX ADMIN — DOCUSATE SODIUM 200 MG: 100 CAPSULE, LIQUID FILLED ORAL at 08:04

## 2025-04-04 RX ADMIN — HYDRALAZINE HYDROCHLORIDE 5 MG: 20 INJECTION INTRAMUSCULAR; INTRAVENOUS at 03:04

## 2025-04-04 RX ADMIN — PROCHLORPERAZINE EDISYLATE 5 MG: 5 INJECTION INTRAMUSCULAR; INTRAVENOUS at 01:04

## 2025-04-04 RX ADMIN — SODIUM CHLORIDE, SODIUM LACTATE, POTASSIUM CHLORIDE, AND CALCIUM CHLORIDE: 600; 310; 30; 20 INJECTION, SOLUTION INTRAVENOUS at 11:04

## 2025-04-04 RX ADMIN — Medication 1 MG: at 01:04

## 2025-04-04 NOTE — PROGRESS NOTES
LABOR NOTE    S:  MD to bedside for routine cervical exam. Epidural working:  yes      O: BP (!) 119/57   Pulse (!) 56   Temp 97.1 °F (36.2 °C) (Oral)   Resp 16   Wt 87.2 kg (192 lb 3.9 oz)   LMP 07/11/2024   SpO2 99%   Breastfeeding No     FHT: 135 bpm, moderate variability, + accels, - decels, Cat 1 (reassuring)  CTX: 2-3min, pit @40  SVE: 6/90/-1    TIMELINE:  2220: 3/80/-2, mora balloon removed, AROM Clr  0100: 4/80/-2, pit @ 24   0450: 6/90/-1, pit @32  0630: 7/90/-1, pit @ 36   0900: 10/100/0, pit @ 40     PLAN:  IOL  Continue Close Maternal/Fetal Monitoring  Pitocin Augmentation per protocol  Recheck 2 hours or PRN    PreE w/ SF  BP: ()/() 116/59  - Patient with symptomatic bradycardia and hypotension. Feels better now after anesthesia administered pressors. BP stable on examination,  - BP as above  - 2+ reflexes     Sun Dean MD  Obstetrics & Gynecology, PGY-1

## 2025-04-04 NOTE — OPERATIVE NOTE ADDENDUM
Certification of Assistant at Surgery       Surgery Date: 2025     Participating Surgeons:  Surgeons and Role:     * Pravin Knowles MD - Primary     * Marianne Morfin DO - Assisting    Procedures:  Procedure(s) (LRB):   SECTION (N/A)    Assistant Surgeon's Certification of Necessity:  I understand that section 1842 (b) (6) (d) of the Social Security Act generally prohibits Medicare Part B reasonable charge payment for the services of assistants at surgery in teaching hospitals when qualified residents are available to furnish such services. I certify that the services for which payment is claimed were medically necessary, and that no qualified resident was available to perform the services. I further understand that these services are subject to post-payment review by the Medicare carrier.      Marianne Morfin DO    2025  1:38 PM

## 2025-04-04 NOTE — NURSING
0354H    HR - 34 bpm  BP - 90/53  MAP - 66  SPO2- 94    RN to bedside when patient called out for lightheadedness. Anesthesia and OB informed, came to bedside and assessed patient. Meds given by anesthesia. EKG done. Magnesium levels ordered. Patient felt better after medication administration and position changed. Will continue to monitor.

## 2025-04-04 NOTE — PROGRESS NOTES
LABOR NOTE    S:  MD to bedside for routine cervical exam. Epidural working:  yes      O: /60   Pulse (!) 57   Temp 98.3 °F (36.8 °C) (Oral)   Resp 16   LMP 07/11/2024   SpO2 99%   Breastfeeding No     FHT: 135 bpm, moderate variability, + accels, - decels, Cat 1 (reassuring)  CTX: Q3, pit @36  SVE: 6/90/-1    TIMELINE:  2220: 3/80/-2, mora balloon removed, AROM Clr  0100: 4/80/-2, pit @ 24   0450: 6/90/-1, pit @32  0630: 7/90/-1, pit @ 36     PLAN:  IOL  Continue Close Maternal/Fetal Monitoring  Pitocin Augmentation per protocol  Recheck 2 hours or PRN    PreE w/ SF  BP: ()/() 116/59  - Patient with symptomatic bradycardia and hypotension. Feels better now after anesthesia administered pressors. BP stable on examination,  - BP as above  - 2+ reflexes     Diane Lima MD (Mary)   Obstetrics and Gynecology, PGY1

## 2025-04-04 NOTE — CARE UPDATE
Excellent pushing effort from patient for 2+ hours with minimal descent of fetal head. The station remains at 0 with caput at +1.  Discussed arrest of descent with patient and recommendation for  delivery. Discussed increased risks of hemorrhage, damage to surrounding structures, infection, and  morbidity. Patient voiced understanding and agrees with proceeding with recommended  delivery.     Will notify anesthesia and L&D charge.    Pravin Knowles M.D.

## 2025-04-04 NOTE — PROGRESS NOTES
04/03/25 1837 04/03/25 1839 04/03/25 1841   Vital Signs   Pulse (!) 38 (!) 45 (!) 53   SpO2 96 %  --   --    BP  --  (!) 86/49 (!) 92/54   MAP (mmHg)  --  61 67      04/03/25 1842   Vital Signs   Pulse (!) 52   SpO2 96 %   BP  --    MAP (mmHg)  --      Anesthesia called to bedside. Patient symptomatic post labetalol treatment for severe blood pressure.

## 2025-04-04 NOTE — NURSING
2125 - RN at bedside when patient reported feeling pressure on her right upper quadrant. Also reported that she has been feeling this for 2 days now. Patient denies headache, shortness of breath and vision changes.     MD notified. Will continue to monitor.

## 2025-04-04 NOTE — ANESTHESIA PROCEDURE NOTES
Epidural    Patient location during procedure: OB   Reason for block: primary anesthetic   Reason for block: labor analgesia requested by patient and obstetrician   Start time: 4/3/2025 11:41 PM  Timeout: 4/3/2025 11:40 PM  End time: 4/3/2025 11:45 PM  Surgery related to: Vaginal Delivery    Staffing  Performing Provider: Peyton Reeves MD  Authorizing Provider: Nate Hendricks MD    Staffing  Performed by: Peyton Reeves MD  Authorized by: Nate Hendricks MD        Preanesthetic Checklist  Completed: patient identified, IV checked, site marked, risks and benefits discussed, surgical consent, monitors and equipment checked, pre-op evaluation, timeout performed, anesthesia consent given, hand hygiene performed and patient being monitored  Preparation  Patient position: sitting  Prep: ChloraPrep  Patient monitoring: Pulse Ox  Reason for block: primary anesthetic   Epidural  Skin Anesthetic: lidocaine 1%  Skin Wheal: 3 mL  Administration type: continuous  Approach: midline  Interspace: L3-4    Injection technique: KARMEN saline  Needle and Epidural Catheter  Needle type: Tuohy   Needle gauge: 17  Needle length: 3.5 inches  Needle insertion depth: 5 cm  Catheter type: springwound  Catheter size: 19 G  Catheter at skin depth: 9 cm  Insertion Attempts: 1  Test dose: 3 mL of lidocaine 1.5% with Epi 1-to-200,000  Additional Documentation: incremental injection, negative aspiration for heme and CSF, no paresthesia on injection, no signs/symptoms of IV or SA injection, no significant pain on injection and no significant complaints from patient  Needle localization: anatomical landmarks  Medications:  Volume per aspiration: 5 mL  Time between aspirations: 5 minutes   Assessment  Ease of block: easy  Patient's tolerance of the procedure: comfortable throughout block and no complaints No inadvertent dural puncture with Tuohy.  Dural puncture performed with spinal needle.

## 2025-04-04 NOTE — TRANSFER OF CARE
Anesthesia Transfer of Care Note    Patient: [place] Pringle    Procedure(s) Performed: Procedure(s) (LRB):   SECTION (N/A)    Patient location: Labor and Delivery    Transport from OR: Transported from OR on room air with adequate spontaneous ventilation    Post pain: adequate analgesia    Post assessment: no apparent anesthetic complications and tolerated procedure well    Post vital signs: stable    Level of consciousness: awake, alert and oriented    Nausea/Vomiting: no nausea/vomiting    Complications: none    Transfer of care protocol was followed      Last vitals: Visit Vitals  /86   Pulse 65   Temp 36.7 °C (98 °F) (Oral)   Resp 16   Wt 87.2 kg (192 lb 3.9 oz)   LMP 2024   SpO2 98%   Breastfeeding No

## 2025-04-04 NOTE — PROGRESS NOTES
LABOR NOTE    S:  MD to bedside for routine cervical exam. Epidural working:  not applicable      O: /86   Pulse 63   Temp 98.3 °F (36.8 °C) (Axillary)   Resp 16   LMP 07/11/2024   SpO2 97%   Breastfeeding No     FHT: 140 bpm, moderate variability, + accels, - decels, Cat 1 (reassuring)  CTX: q 4 minutes, pit @ 16  SVE: 3/80/-2, AROM Clr    TIMELINE:  2220: 3/80/-2, mora balloon removed, AROM Clr    PLAN:  IOL  Continue Close Maternal/Fetal Monitoring  Pitocin Augmentation per protocol  Recheck 4 hours or PRN    PreE w/ SF  - Feeling flush, otherwise asymptomatic  - BP: ()/() 142/86  - 2+ reflexes     Bertha Palma MD  Obstetrics and Gynecology, PGY-2

## 2025-04-04 NOTE — L&D DELIVERY NOTE
Section Procedure Note    Procedure:   1. Primary low transverse  Section via pffanenstiel skin incision    Indications:   1. failure to progress: arrest of descent    Pre-operative Diagnosis:   1. IUP at 38 week 1 day pregnancy  2. Severe PreEclampsia  3. AMA  4. IVF pregnancy  5. Hypothyroidism    Post-operative Diagnosis:   same    Surgeon: Pravin Knowles MD    Assistants: Marianne Morfin DO  There was no qualified resident available to assist with surgery.    Anesthesia: Epidural anesthesia    Findings:    Normal appearing uterus, fallopian tubes, and ovaries. Viable female infant delivered without difficulty. Poor uterine tone following delivery requiring pitocin, hemabate, and rectal cytotec. Excellent hemostasis and improved tone at conclusion of case.     Estimated Blood Loss:  480 mL           Total IV Fluids:  see anesthesia     UOP: 200 mL    Specimens: None    PreOp CBC:   Lab Results   Component Value Date    WBC 7.29 2025    HGB 13.4 2025    HCT 38.0 2025    MCV 94 2025     (L) 2025                     Complications:  None; patient tolerated the procedure well.           Disposition: PACU - hemodynamically stable.           Condition: stable    Procedure Details   The patient was taken to Operating Room, identified as Aurora Pringle and the procedure verified as  Delivery. A Time Out was held and the above information confirmed.    The patient was prepped and draped in the usual sterile manner while placed in a dorsal supine position with a left lateral tilt.  A mora catheter was also placed per nursing. Preoperative antibiotics Ancef 2 g and azithromycin 500 mg were administered. A fetal pillow was placed vaginally to elevate the fetal head out of the pelvis. An allis test was performed to confirm adequate anesthesia.  A Pfannenstiel skin incision was made and carried down through the subcutaneous tissue to the fascia. Fascial  incision was made and extended transversely. The fascia was grasped with [facility] clamps and  from the underlying rectus tissue superiorly and inferiorly. The peritoneum was identified, found to be free of adherent bowel, and entered bluntly. The peritoneal incision was extended longitudinally. The vesico-uterine peritoneum was identified, and bladder blade was inserted. The vesico-uterine peritoneum was incised transversely and the bladder flap was bluntly freed from the lower uterine segment. The bladder blade was reinserted to keep the bladder out of the operative field. A low transverse uterine incision was made with knife and extended with cephalo-caudad traction. The infant was noted to be in cephalic presentation. The fetal head was brought to the incision and elevated out of the pelvis. The patient delivered a single viable female infant without difficulty.  Infant weighed 3060 grams with Apgar scores of 9/9 at one and five minutes respectively. After the umbilical cord was clamped and cut, cord blood was obtained for evaluation. The placenta was removed intact, appeared normal, and was discarded. The uterus was exteriorized. The uterine outline, tubes and ovaries appeared normal. The uterine incision was closed with running locked sutures of 1-0 chromic. Several interrupted figure-of-eight sutures were placed for hemostasis. Excellent hemostasis was observed. Poor uterine tone was noted and extra pitocin and IM hemabate was given. The uterus was then returned to the abdominal cavity. Incision was reinspected and good hemostasis was noted. The abdominal cavity was irrigated to remove clots. The fascia was then reapproximated with running sutures of 1-0 PDS. The subcutaneous fat was reapproximated with 2-0 Vicryl, and skin was reapproximated with 4-0 monocryl in a subcuticular fashion.    Instrument, sponge, and needle counts were correct prior the abdominal closure and at the conclusion of the case.  "    The patient tolerated procedure well and was taken to the recovery room in stable condition after the procedure.    **NOTE: This patient IS a candidate for trial of labor after  delivery**    Pravin Knowles M.D.      Specimens:   Specimen (24h ago, onward)      None            Condition: Good    VTE Risk Mitigation (From admission, onward)           Ordered     enoxaparin injection 40 mg  Every 24 hours         25 1200     IP VTE HIGH RISK PATIENT  Once         25 1403                    Disposition: PACU - hemodynamically stable.    Attestation: Good         Delivery Information for Chino Pringle    Birth information:  YOB: 2024   Time of birth: 12:56 PM   Sex: female   Head Delivery Date/Time: 2025 12:56 PM   Delivery type: , Low Transverse   Gestational Age: 38w1d       Delivery Providers    Delivering clinician: Pravin Knowles MD   Provider Role    Band, DO Mathew Mccall Emily A, RN Beshenich, Kayla, RN Hilkirk, Jennifer, RN Toups, Jamie,      [facility], [place],                Measurements    Weight: 3060 g  Weight (lbs): 6 lb 11.9 oz  Length: 47 cm  Length (in): 18.5"  Head circumference: 35.5 cm  Chest circumference: 33.5 cm         Apgars    Living status: Living  Apgar Component Scores:  1 min.:  5 min.:  10 min.:  15 min.:  20 min.:    Skin color:  1  1       Heart rate:  2  2       Reflex irritability:  2  2       Muscle tone:  2  2       Respiratory effort:  2  2       Total:  9  9       Apgars assigned by: MARIO JUAREZ         Operative Delivery    Forceps attempted?: No  Vacuum extractor attempted?: No         Shoulder Dystocia    Shoulder dystocia present?: No           Presentation    Presentation: Vertex           Interventions/Resuscitation    Method: Bulb Suctioning, Tactile Stimulation, Deep Suctioning       Cord    Vessels: 3 vessels  Complications: Nuchal  Nuchal Intervention: reduced  Nuchal Cord " Description: loose nuchal cord  Number of Loops: 1  Delayed Cord Clamping?: Yes  Cord Clamped Date/Time: 2025 12:57 PM  Cord Blood Disposition: Sent with Baby  Gases Sent?: No  Stem Cell Collection (by MD): No       Placenta    Placenta delivery date/time:   Placenta removal:            Labor Events:       labor: No     Labor Onset Date/Time:         Dilation Complete Date/Time: 2025 09:00     Start Pushing Date/Time: 2025 09:21       Start Pushing Date/Time: 2025 09:21     Rupture Date/Time: 25        Rupture type: ARM (Artificial Rupture)        Fluid Amount: Moderate     Fluid Color: Bloody              steroids: None     Antibiotics given for GBS: Yes     Induction: balloon dilation (Fiore);oxytocin     Indications for induction:  Hypertension     Augmentation: amniotomy     Indications for augmentation: Ineffective Contraction Pattern     Labor complications: Failure to Progress in Second Stage     Additional complications:          Cervical ripening:                     Delivery:      Episiotomy: None     Indication for Episiotomy:       Perineal Lacerations: None Repaired:      Periurethral Laceration:   Repaired:     Labial Laceration:   Repaired:     Sulcus Laceration:   Repaired:     Vaginal Laceration:   Repaired:     Cervical Laceration:   Repaired:     Repair suture: None     Repair # of packets:       Last Value - EBL - Nursing (mL):       Sum - EBL - Nursing (mL): 0     Last Value - EBL - Anesthesia (mL):      Calculated QBL (mL): 480     Running total QBL (mL): 480     Vaginal Sweep Performed:       Surgicount Correct:       Vaginal Packing:   Quantity:       Other providers:       Anesthesia    Method: Epidural, Spinal          Details (if applicable):  Trial of Labor Yes    Categorization: Primary    Priority: Urgent   Indications for : Diagnosis of second stage arrest (no descent or rotation)   Incision Type: low  transverse     Additional  information:  Forceps:    Vacuum:    Breech:    Observed anomalies    Other (Comments):

## 2025-04-04 NOTE — BRIEF OP NOTE
[facility] - Labor & Delivery  Brief Operative Note    SUMMARY     Surgery Date: 2025     Surgeons and Role:     * Pravin Knowles MD - Primary     * Marianne Morfin DO - Assisting      Pre-op Diagnosis:  Arrest of Descent    Post-op Diagnosis:  Post-Op Diagnosis Codes:     * Arrest of Descent    Procedure(s) (LRB):   SECTION (N/A)    Anesthesia: Spinal/Epidural    Implants:  * No implants in log *    Operative Findings: Normal appearing uterus, fallopian tubes, and ovaries. Viable female infant delivered without difficulty. Poor uterine tone following delivery requiring pitocin, hemabate, and rectal cytotec. Excellent hemostasis and improved tone at conclusion of case.    Estimated Blood Loss: 480 mL         Specimens:   Specimen (24h ago, onward)      None          * No specimens in log *    [id number]    Pravin Knowles M.D.

## 2025-04-04 NOTE — NURSING
At 1441 with 2 severe /74, referred to MD and ordered to give hydralazine 5mg IV push at 1458    At 1510 still with severe /79, MD made aware with order to repeat BP after 15minutes/give another hydralazine 5mg IV

## 2025-04-04 NOTE — PROGRESS NOTES
LABOR NOTE    S:  MD to bedside for routine cervical exam. Epidural working:  yes      O: BP (!) 153/87   Pulse 64   Temp 98.3 °F (36.8 °C) (Oral)   Resp 15   LMP 07/11/2024   SpO2 97%   Breastfeeding No     FHT: 135 bpm, moderate variability, + accels, - decels, Cat 1 (reassuring)  CTX: q 4 minutes, pit @ 16  SVE: 4/80/-2, AROM Clr    TIMELINE:  2220: 3/80/-2, mora balloon removed, AROM Clr  0100: 4/80/-2, pit @ 24     PLAN:  IOL  Continue Close Maternal/Fetal Monitoring  Pitocin Augmentation per protocol  Recheck 4 hours or PRN    PreE w/ SF  - asymptomatic  - BP as above  - 2+ reflexes     Diane Lima MD (Mary)   Obstetrics and Gynecology, PGY1

## 2025-04-04 NOTE — PROGRESS NOTES
LABOR NOTE    S:  MD to bedside for routine cervical exam. Epidural working:  yes      O: BP (!) 116/59   Pulse 60   Temp 98.3 °F (36.8 °C) (Oral)   Resp 15   LMP 07/11/2024   SpO2 95%   Breastfeeding No     FHT: 135 bpm, moderate variability, + accels, - decels, Cat 1 (reassuring)  CTX: difficult to discern, pit @32  SVE: 6/90/-1    TIMELINE:  2220: 3/80/-2, mora balloon removed, AROM Clr  0100: 4/80/-2, pit @ 24   0450: 6/90/-1, pit @32    PLAN:  IOL  Continue Close Maternal/Fetal Monitoring  Pitocin Augmentation per protocol  Recheck 2 hours or PRN    PreE w/ SF  BP: ()/() 116/59  - Patient with symptomatic bradycardia and hypotension. Feels better now after anesthesia administered pressors. MAP 76  - BP as above  - 2+ reflexes     Chantal Soriano MD  [facility]   OBGYN PGY3

## 2025-04-04 NOTE — PROGRESS NOTES
Detail Level: Detailed Patient's blood pressure on admit was 172/90. Recheck at 1732 was 166/82. MD Aguilar notified. 5mg hydralazine ordered, given at 1744. 20min recheck at 1809 was 169/81. MD Aguilar notified, 10mg hydralazine ordered, given at 1822. 20min recheck at 1845 was 130/60. MD Aguilar updated. No further orders at this time.

## 2025-04-04 NOTE — PROGRESS NOTES
1959H    HR - 51 bpm  BP - 101/58 mmhg  MAP - 76  SPO2 - 97    RN to bedside when patient complained of lightheadedness, LR bolus administered.     Anesthesia and OB informed. Came to bedside for patient assessment.     Patient stated feeling better after bolus initiated. Will continue to monitor.

## 2025-04-04 NOTE — PROGRESS NOTES
Resident to bedside as patient reported lightheadedness and dizzness tp RN and RN noted HR 47. Patient reported this just started. She is unaware if she has had episodes of bradycardia in the past. 's/80's MAP 80. Magnesium check performed and reflexes +2. EKG ordered stat and anesthesia called to eval patient as well.    Bertha Palma MD  Obstetrics and Gynecology, PGY-2

## 2025-04-04 NOTE — DISCHARGE INSTRUCTIONS
Community Resources for Breastfeeding Mothers:   Hospital Breastfeeding Centers/ Lactation Consultants:   [facility]........................................................................................[phone]  [facility]....................................................................................[phone]   [facility]..........................................................[phone]   [facility].................................................[phone]   [facility].......................................................................................[phone]   [facility].................................[phone]   [facility].......................................[phone]   [facility]..................................................[phone]   [facility].....................................................................[phone]      [facility] (Poison Control)...........[phone]  PoisonHelp.org   Free medical advice 24/7 through the Poison Help Line and the online tool      Online Resources:   International Breastfeeding [place] ...............................................................................ibconline.ca   Dr Clrak Aleman online resource provides videos, articles, and information sheets.     Coeffective...............................................................................................................coeffective.com   Download the free mobile crescencio to help get off to a great start with breastfeeding.   Droplet.....................................................................................................................[employer].Gynzy   Global Health  Media...........................................................................................Alerts.org   Videos that teach and empower mothers and caregivers   Infant [facility].............................................................................[phone]      StyleTech   Provides up to date information for medication use by moms during pregnancy and while breastfeeding.   Karli Jaramillo....................................................................................................................kellymom.com   Provides online information on breastfeeding and parenting      La Leche League........................................................................................ lllalmsla.org   /   llli.org   Mother-to-mother support groups with education, information support, and encouragement    Work and Pump........................................................................................... eLibs.com.com   Information about breastfeeding for working moms     [place] Resources:   [place] Breastfeeding [facility]............................... [phone]    [facility]   Find local breastfeeding support   [place] Breastfeeding Support............................................................ LaBreastfeedingSport.org   Zip code search of breastfeeding resources in your area   Partners for Healthy Babies............................................................[phone]   3662989mhbd.org   Connects [place] moms and their families to health and pregnancy resources.  24/7   WIC (Women, Infant, Children)......................................................... [phone]   ldh.la.gov/WIC   Download the Blinkbuggy crescencio, get code from WIC office    [facility] Resources:     Baby Cafe............................................................................................................. babycafeusa.org   Free, drop in, informal  breastfeeding support groups offering professional lactation care and intervention.    [facility]/ [place] [facility]....................................... birthMeyersdaleHyperink.Luxera   Infant feeding drop in clinics, Lactation services, support groups, education programs   Cafe au Lait...............................................[phone]   Meetingmix.com.com/groups/[facility]   Free breastfeeding support group for families of color   Mothers Milk Bank [facility]....................................................[phone]                                                             [facility]/services/mothers-milk-bank-at[facility]   Sopsy.comTallyfy Lactation, LLC.................... [email or web address]..................[phone]    SERENA Nesting..................................................................................[phone] nolanesting.com   In person and virtual support for families through pregnancy, birth, and early parenthood.       [facility] [place]- Dr. Violet Tavarez.......................[phone] [address] [place] [address]                                  [email or web address]   [email or web address]   [facility] Lactation Care, [facility] (Cornelia Brown RN, IBCLC) ............................[phone]    [email or web address]    Healthy Start [place].....................................[phone] ([place])  [phone] ([place])   [facility].gov/health-department/healthy-start   Serves women of childbearing age and addresses issues for pregnant women and their children from birth  to age two.          [facility] Leche [facility]............................. Zakaz.ua.Luxera/ Meetingmix.com.Luxera/ azul   In person and virtual mother to mother support groups with education, information support and   encouragement to women who  want to breastfeed      [place] Resources:   Breastfeeding Resources- [facility].....msd.ms.gov (under womens services)   Find resources and info about planning for breastfeeding, its benefits, and help with breastfeeding  s uccessfully.    [facility]....................................... [employer]   [phone]   [address] [facility] [place], [place]. Call or text 24/7   [facility].......................................................[email or web address]   [facility] Lactation Consultants sere [facility], including [facility],   [facility], and surrounding areas.    [place] Breastfeeding Coalition...............................................................................msbfc.org   Promotes and supports breastfeeding with families, health providers, and communities.   [facility] breastfeeding Coalition.....................................................................smbfc.org   Find breastfeeding resources and support groups in your area.    WIC Nutrition Program- [facility] of Health.................................... ms.gov

## 2025-04-05 LAB
ABSOLUTE EOSINOPHIL (OHS): 0.04 K/UL
ABSOLUTE MONOCYTE (OHS): 1.29 K/UL (ref 0.3–1)
ABSOLUTE NEUTROPHIL COUNT (OHS): 14.21 K/UL (ref 1.8–7.7)
BASOPHILS # BLD AUTO: 0.05 K/UL
BASOPHILS NFR BLD AUTO: 0.3 %
ERYTHROCYTE [DISTWIDTH] IN BLOOD BY AUTOMATED COUNT: 13.5 % (ref 11.5–14.5)
HCT VFR BLD AUTO: 35.5 % (ref 37–48.5)
HGB BLD-MCNC: 12.7 GM/DL (ref 12–16)
IMM GRANULOCYTES # BLD AUTO: 0.11 K/UL (ref 0–0.04)
IMM GRANULOCYTES NFR BLD AUTO: 0.6 % (ref 0–0.5)
LYMPHOCYTES # BLD AUTO: 1.54 K/UL (ref 1–4.8)
MCH RBC QN AUTO: 33.4 PG (ref 27–31)
MCHC RBC AUTO-ENTMCNC: 35.8 G/DL (ref 32–36)
MCV RBC AUTO: 93 FL (ref 82–98)
NUCLEATED RBC (/100WBC) (OHS): 0 /100 WBC
PLATELET # BLD AUTO: ABNORMAL 10*3/UL
PLATELET BLD QL SMEAR: ABNORMAL
PMV BLD AUTO: 13.8 FL (ref 9.2–12.9)
RBC # BLD AUTO: 3.8 M/UL (ref 4–5.4)
RELATIVE EOSINOPHIL (OHS): 0.2 %
RELATIVE LYMPHOCYTE (OHS): 8.9 % (ref 18–48)
RELATIVE MONOCYTE (OHS): 7.5 % (ref 4–15)
RELATIVE NEUTROPHIL (OHS): 82.5 % (ref 38–73)
WBC # BLD AUTO: 17.24 K/UL (ref 3.9–12.7)

## 2025-04-05 PROCEDURE — 25000003 PHARM REV CODE 250

## 2025-04-05 PROCEDURE — 85025 COMPLETE CBC W/AUTO DIFF WBC: CPT

## 2025-04-05 PROCEDURE — 11000001 HC ACUTE MED/SURG PRIVATE ROOM

## 2025-04-05 PROCEDURE — 63600175 PHARM REV CODE 636 W HCPCS: Mod: JZ,TB

## 2025-04-05 PROCEDURE — 63600175 PHARM REV CODE 636 W HCPCS

## 2025-04-05 PROCEDURE — 36415 COLL VENOUS BLD VENIPUNCTURE: CPT

## 2025-04-05 RX ORDER — OXYCODONE HYDROCHLORIDE 10 MG/1
10 TABLET ORAL EVERY 4 HOURS PRN
Refills: 0 | Status: DISCONTINUED | OUTPATIENT
Start: 2025-04-05 | End: 2025-04-08 | Stop reason: HOSPADM

## 2025-04-05 RX ORDER — OXYCODONE HYDROCHLORIDE 5 MG/1
5 TABLET ORAL EVERY 4 HOURS PRN
Refills: 0 | Status: DISCONTINUED | OUTPATIENT
Start: 2025-04-05 | End: 2025-04-08 | Stop reason: HOSPADM

## 2025-04-05 RX ORDER — LEVOTHYROXINE SODIUM 50 UG/1
50 TABLET ORAL
Status: DISCONTINUED | OUTPATIENT
Start: 2025-04-05 | End: 2025-04-08 | Stop reason: HOSPADM

## 2025-04-05 RX ORDER — LEVOTHYROXINE SODIUM 50 UG/1
100 TABLET ORAL
Status: DISCONTINUED | OUTPATIENT
Start: 2025-04-05 | End: 2025-04-05

## 2025-04-05 RX ORDER — ACETAMINOPHEN 325 MG/1
650 TABLET ORAL EVERY 6 HOURS
Status: DISCONTINUED | OUTPATIENT
Start: 2025-04-05 | End: 2025-04-08 | Stop reason: HOSPADM

## 2025-04-05 RX ADMIN — KETOROLAC TROMETHAMINE 30 MG: 30 INJECTION, SOLUTION INTRAMUSCULAR; INTRAVENOUS at 03:04

## 2025-04-05 RX ADMIN — DOCUSATE SODIUM 200 MG: 100 CAPSULE, LIQUID FILLED ORAL at 10:04

## 2025-04-05 RX ADMIN — SERTRALINE HYDROCHLORIDE 25 MG: 25 TABLET ORAL at 08:04

## 2025-04-05 RX ADMIN — ACETAMINOPHEN 650 MG: 325 TABLET, FILM COATED ORAL at 05:04

## 2025-04-05 RX ADMIN — DIPHENHYDRAMINE HYDROCHLORIDE 25 MG: 25 CAPSULE ORAL at 08:04

## 2025-04-05 RX ADMIN — IBUPROFEN 800 MG: 400 TABLET ORAL at 05:04

## 2025-04-05 RX ADMIN — MAGNESIUM SULFATE HEPTAHYDRATE 2 G/HR: 40 INJECTION, SOLUTION INTRAVENOUS at 05:04

## 2025-04-05 RX ADMIN — LEVOTHYROXINE SODIUM 50 MCG: 50 TABLET ORAL at 05:04

## 2025-04-05 RX ADMIN — KETOROLAC TROMETHAMINE 30 MG: 30 INJECTION, SOLUTION INTRAMUSCULAR; INTRAVENOUS at 11:04

## 2025-04-05 RX ADMIN — OXYCODONE HYDROCHLORIDE 5 MG: 5 TABLET ORAL at 08:04

## 2025-04-05 RX ADMIN — ACETAMINOPHEN 650 MG: 325 TABLET, FILM COATED ORAL at 11:04

## 2025-04-05 RX ADMIN — DOCUSATE SODIUM 200 MG: 100 CAPSULE, LIQUID FILLED ORAL at 08:04

## 2025-04-05 RX ADMIN — ENOXAPARIN SODIUM 40 MG: 40 INJECTION SUBCUTANEOUS at 08:04

## 2025-04-05 RX ADMIN — PRENATAL VIT W/ FE FUMARATE-FA TAB 27-0.8 MG 1 TABLET: 27-0.8 TAB at 08:04

## 2025-04-05 NOTE — LACTATION NOTE
04/05/25 1735   Breasts WDL   Breast WDL WDL   Breast Pumping   Breast Pumping Interventions post-feed pumping encouraged   Breast Pumping double electric breast pump utilized   Maternal Feeding Assessment   Maternal Emotional State assist needed   Infant Positioning clutch/football;cross-cradle   Signs of Milk Transfer infant jaw motion present   Pain with Feeding no   Comfort Measures Before/During Feeding infant position adjusted;latch adjusted;maternal position adjusted   Latch Assistance yes   Reproductive Interventions   Breast Care: Breastfeeding open to air   Breastfeeding Assistance feeding on demand promoted;feeding cue recognition promoted;assisted with positioning;infant latch-on verified;infant suck/swallow verified;support offered   Breastfeeding Support maternal rest encouraged;maternal nutrition promoted;maternal hydration promoted;lactation counseling provided;infant-mother separation minimized;encouragement provided;diary/feeding log utilized     Lactation note: lc assisted pt with latching infant to the left breast, cross cradle hold, wide latch, audible swallows with compression. Change to the right breast, football hold, latched and nursing well. Encouraged to continue the feeding , give EBM previously pumped. Pump both breast after feeding.

## 2025-04-05 NOTE — ANESTHESIA POSTPROCEDURE EVALUATION
Anesthesia Post Evaluation    Patient: Aurora Pringle    Procedure(s) Performed: Procedure(s) (LRB):   SECTION (N/A)    Final Anesthesia Type: epidural      Patient location during evaluation: labor & delivery  Patient participation: Yes- Able to Participate  Level of consciousness: awake and alert  Post-procedure vital signs: reviewed and stable  Pain management: adequate  Airway patency: patent    PONV status at discharge: No PONV  Anesthetic complications: no      Cardiovascular status: blood pressure returned to baseline  Respiratory status: unassisted  Hydration status: euvolemic  Follow-up not needed.              Vitals Value Taken Time   /65 25 08:01   Temp 36.7 °C (98 °F) 25 03:22   Pulse 63 25 08:16   Resp 16 25 03:22   SpO2 93 % 25 08:16   Vitals shown include unfiled device data.      No case tracking events are documented in the log.      Pain/Linwood Score: Pain Rating Prior to Med Admin: 0 (2025  5:25 AM)

## 2025-04-05 NOTE — PROGRESS NOTES
POSTPARTUM PROGRESS NOTE    Subjective:     PPD/POD#: 1   Procedure: Primary LTCS   EGA: 38w1d   N/V: No   F/C: No   Abd Pain: Mild, well-controlled with oral pain medication   Lochia: Mild   Voiding: Yes, voiding via mora    Ambulating: No   Bowel fnc: No   Contraception: POPs > OCPs   NAEO. Pain well controlled on PRN medication, has not ambulated yet as she still has mora in place. Overall doing well, denies symptoms of Pre-E and anemia at this time.   Objective:      Temp:  [97.1 °F (36.2 °C)-98.2 °F (36.8 °C)] 98 °F (36.7 °C)  Pulse:  [56-74] 68  Resp:  [14-18] 16  SpO2:  [93 %-100 %] 96 %  BP: (106-172)/(55-90) 110/64    Abdomen: Soft, appropriately tender   Uterus: Firm, no fundal tenderness   Incision: Bandage in place without shadowing     Lab Review    Recent Labs   Lab 04/03/25  1211 04/04/25  0450   *  --    K 4.5  --      --    CO2 17*  --    BUN 16  --    CREATININE 0.8  --    BILITOT 0.3  --    ALKPHOS 259*  --    ALT 21  --    AST 25  --    MG  --  7.1*       Recent Labs   Lab 04/03/25  1211   WBC 7.29   HGB 13.4   HCT 38.0   MCV 94   *   .cbc      I/O    Intake/Output Summary (Last 24 hours) at 4/5/2025 0509  Last data filed at 4/5/2025 0500  Gross per 24 hour   Intake 3687.92 ml   Output 2400 ml   Net 1287.92 ml        Assessment and Plan:   Postpartum care:  - Patient doing well  - post partum CBC, ordered awaiting results   - Adequate UOP, however mora to remain in place until magnesium completed   - Continue routine management and advances.    PreE w/SF  - BP as above  - asymptomatic  - preE labs as above  - UOP: 0.68 cc/kg/hr  - Mag: continue until 1300  - s/p Hydral 5/5/5/10 (1530,1737,1820)  - Hypertensive agent : Procardia 30 XL (4/4)    Symptomatic Bradycardia   - Asymptomatic   - HR as above  - EKG: Sinus Ad     AMA  - encourage ambulation  - post-partum lovenox: 40 mg qD     Hypothyroidism  - continue home synthroid    Anxiety/Depression  - Mood stable  -  Medications: continue zoloft   - Will need 1-2 week postpartum mood check    GTP  - asymptomatic   - PLT: 123 > 117 > p    Alicia Woo  [facility]   OBGYN PGY1

## 2025-04-05 NOTE — PROGRESS NOTES
POSTPARTUM PROGRESS NOTE    Subjective:     PPD/POD#: 2   Procedure: Primary LTCS   EGA: 38w1d   N/V: No   F/C: No   Abd Pain: Mild, well-controlled with oral pain medication   Lochia: Mild   Voiding: Yes   Ambulating: Yes   Bowel fnc: Yes, passing flatus   Contraception: POPs > OCPs     Patient reports worsening rash and itch overnight. She rash started in RLQ and spread to rest of anterior abdomen and back. Rash follows pattern of chlorhexadine prep. Denies symptoms of Pre-E and anemia at this time.     Objective:      Temp:  [97.8 °F (36.6 °C)-98.9 °F (37.2 °C)] 97.8 °F (36.6 °C)  Pulse:  [53-75] 53  Resp:  [16-18] 18  SpO2:  [91 %-96 %] 96 %  BP: (110-137)/(56-79) 125/60    Abdomen: Soft, appropriately tender. Erythematous maculopapular rash abdomen and back at sites of chlorhexadine prep   Uterus: Firm, no fundal tenderness   Incision: Bandage in place without shadowing               Lab Review    Recent Labs   Lab 04/03/25  1211 04/04/25  0450   *  --    K 4.5  --      --    CO2 17*  --    BUN 16  --    CREATININE 0.8  --    BILITOT 0.3  --    ALKPHOS 259*  --    ALT 21  --    AST 25  --    MG  --  7.1*       Recent Labs   Lab 04/03/25  1211 04/05/25  0625   WBC 7.29 17.24*   HGB 13.4 12.7   HCT 38.0 35.5*   MCV 94 93   *  --      I/O    Intake/Output Summary (Last 24 hours) at 4/6/2025 0625  Last data filed at 4/6/2025 0035  Gross per 24 hour   Intake 643.69 ml   Output 1625 ml   Net -981.31 ml        Assessment and Plan:   Postpartum care:  - Patient doing well  - Post partum CBC stable   - Continue routine management and advances.  - Continue Lovenox 40 qd (AMA + C/S + PreE w/ SF)    Rash  - Pruritic, erythematous maculopapular rash at sites of chlorhexadine prep on abdomen and back  - Minimal improvement with Benadryl  - Added cortisone ointment for topical use BID  - Will continue to monitor    PreE w/SF  - BP as above  - asymptomatic  - preE labs as above  - Mag: s/p Mag  -  Hypertensive agent : Procardia 30 XL initiated 4/4, held in the setting of normotensive BP    AMA  - encourage ambulation  - post-partum lovenox: 40 mg qD    Hypothyroidism  - continue home synthroid    Anxiety/Depression  - Mood stable  - Medications: continue zoloft   - Will need 1-2 week postpartum mood check    GTP  - asymptomatic   - PLT: 123 > 117     oLrraine Kumar MD  Obstetrics & Gynecology, PGY-1

## 2025-04-05 NOTE — PLAN OF CARE
Patient safety maintained, side rails up, bed low and locked position. Fiore and SCDs in place until Mag/LR discontinued @ 1300. Patient now ambulating and voiding. Pain controlled with scheduled pain medication. VSS. Intake and output managed. Patient has denied any visual changes, RUQ pain, or headaches. Fundus midline, firm, with moderate  lochia. Patient responding to infant cues.  Incision site dressed; dressing clean, dry, and intact.     [employer] Symphony pump, tubing, collections containers and labels brought to bedside at 1030. First pumping session initiated. Discussed proper pump setting of initiation phase.  Instructed on proper usage of pump and to adjust suction according to maximum comfort level.  Verified appropriate flange fit.  Educated on the frequency and duration of pumping in order to promote and maintain a full milk supply.  Hands on pumping technique reviewed.  Encouraged hand expression after pumping.  Instructed on cleaning of breast pump parts.  Written instructions also given.  Pt verbalized understanding and appropriate recall for proper milk handling, collection, labeling, storage and transportation. 5cc collected and given to baby. No further needs at this time.

## 2025-04-05 NOTE — PLAN OF CARE
Continue to feed on cue. Call for latch assistance, pump after feeding for extra stimulation. Offer supplementation only  if needed after putting infant to the breast first.

## 2025-04-05 NOTE — PLAN OF CARE
Problem: Adult Inpatient Plan of Care  Goal: Plan of Care Review  Outcome: Progressing  Goal: Patient-Specific Goal (Individualized)  Outcome: Progressing     Problem: Infection  Goal: Absence of Infection Signs and Symptoms  Outcome: Progressing     Problem: Postpartum ( Delivery)  Goal: Successful Parent Role Transition  Outcome: Progressing  Intervention: Support Parent Role Transition  Flowsheets (Taken 2025)  Supportive Measures:   active listening utilized   problem-solving facilitated  Goal: Optimal Pain Control and Function  Outcome: Progressing  Intervention: Prevent or Manage Pain  Flowsheets (Taken 2025)  Pain Management Interventions: medication offered     Problem: Hypertensive Disorders in Pregnancy  Goal: Patient-Fetal Stabilization  Outcome: Progressing  Intervention: Optimize Blood Pressure and Fluid Status  Flowsheets (Taken 2025)  Fluid/Electrolyte Management: fluids provided  Intervention: Monitor and Manage Symptom Progression  Flowsheets (Taken 2025)  Seizure Precautions: clutter-free environment maintained  Medication Review/Management: medications reviewed

## 2025-04-05 NOTE — LACTATION NOTE
"   04/05/25 1706   Breasts WDL   Breast WDL WDL   Breast Pumping   Breast Pumping Interventions post-feed pumping encouraged   Breast Pumping double electric breast pump utilized  (pt states that she just pumped. LC dank up colostrum into syringe for pt to feed infant at next feeding. reviewed milk good for 4-6 hours)   Maternal Feeding Assessment   Maternal Emotional State assist needed   Latch Assistance   (encouraged to call for latch assistance)   Reproductive Interventions   Breast Care: Breastfeeding open to air   Breastfeeding Assistance feeding cue recognition promoted;feeding on demand promoted;support offered   Breastfeeding Support maternal rest encouraged;maternal nutrition promoted;maternal hydration promoted;lactation counseling provided;infant-mother separation minimized;encouragement provided;diary/feeding log utilized     Lactation note: lactation rounds , basic education reviewed. Pt states that she had opted to offer formula supplementation secondary to "feeling really bad" states that she threw up a lot during labor and after delivery was very exhausted. Pt states that she just finished pumping around 4-430 and colostrum noted in container. Reviewed supply and demand. LC reviewed pump care and cleaning. LC dank colostrum up into 1 ml syringe and capped , pt will use for the next feeding.. LC cleaned pt's pump parts and set on paper towel to air dry.   Pt reports that infant has latched and seems to be nursing well. LC encouraged pt to call for latch assistance when the infant cues. Continue to feed on cue, offer supplementation post feeding if need and pump after each feeding.  Pt verbalized understanding.   "

## 2025-04-06 PROBLEM — O28.3 ECHOGENIC INTRACARDIAC FOCUS OF FETUS ON PRENATAL ULTRASOUND: Status: RESOLVED | Noted: 2024-12-02 | Resolved: 2025-04-06

## 2025-04-06 PROCEDURE — 25000003 PHARM REV CODE 250

## 2025-04-06 PROCEDURE — 63600175 PHARM REV CODE 636 W HCPCS

## 2025-04-06 PROCEDURE — 63600175 PHARM REV CODE 636 W HCPCS: Mod: JZ,TB

## 2025-04-06 PROCEDURE — 11000001 HC ACUTE MED/SURG PRIVATE ROOM

## 2025-04-06 RX ORDER — FAMOTIDINE 20 MG/1
20 TABLET, FILM COATED ORAL EVERY 8 HOURS
Status: DISCONTINUED | OUTPATIENT
Start: 2025-04-06 | End: 2025-04-08 | Stop reason: HOSPADM

## 2025-04-06 RX ORDER — DIPHENHYDRAMINE HCL 25 MG
25 CAPSULE ORAL NIGHTLY
Status: DISCONTINUED | OUTPATIENT
Start: 2025-04-06 | End: 2025-04-06

## 2025-04-06 RX ORDER — MAG HYDROX/ALUMINUM HYD/SIMETH 200-200-20
SUSPENSION, ORAL (FINAL DOSE FORM) ORAL 2 TIMES DAILY
Status: DISCONTINUED | OUTPATIENT
Start: 2025-04-06 | End: 2025-04-06

## 2025-04-06 RX ORDER — CETIRIZINE HYDROCHLORIDE 5 MG/1
5 TABLET ORAL EVERY MORNING
Status: DISCONTINUED | OUTPATIENT
Start: 2025-04-07 | End: 2025-04-06

## 2025-04-06 RX ORDER — DIPHENHYDRAMINE HYDROCHLORIDE 50 MG/ML
25 INJECTION, SOLUTION INTRAMUSCULAR; INTRAVENOUS EVERY 8 HOURS
Status: DISCONTINUED | OUTPATIENT
Start: 2025-04-06 | End: 2025-04-06

## 2025-04-06 RX ORDER — DIPHENHYDRAMINE HCL 25 MG
25 CAPSULE ORAL EVERY 6 HOURS
Status: CANCELLED | OUTPATIENT
Start: 2025-04-07

## 2025-04-06 RX ORDER — FAMOTIDINE 10 MG/ML
20 INJECTION, SOLUTION INTRAVENOUS EVERY 8 HOURS
Status: DISCONTINUED | OUTPATIENT
Start: 2025-04-06 | End: 2025-04-06

## 2025-04-06 RX ORDER — DEXTROMETHORPHAN HYDROBROMIDE, GUAIFENESIN 5; 100 MG/5ML; MG/5ML
650 LIQUID ORAL EVERY 8 HOURS
Qty: 30 TABLET | Refills: 0 | Status: SHIPPED | OUTPATIENT
Start: 2025-04-06

## 2025-04-06 RX ORDER — DOCUSATE SODIUM 100 MG/1
100 CAPSULE, LIQUID FILLED ORAL 2 TIMES DAILY
Qty: 30 CAPSULE | Refills: 1 | Status: SHIPPED | OUTPATIENT
Start: 2025-04-06

## 2025-04-06 RX ORDER — OXYCODONE HYDROCHLORIDE 5 MG/1
5 TABLET ORAL EVERY 4 HOURS PRN
Qty: 20 TABLET | Refills: 0 | Status: SHIPPED | OUTPATIENT
Start: 2025-04-06

## 2025-04-06 RX ORDER — IBUPROFEN 600 MG/1
600 TABLET ORAL EVERY 6 HOURS PRN
Qty: 30 TABLET | Refills: 1 | Status: SHIPPED | OUTPATIENT
Start: 2025-04-06

## 2025-04-06 RX ADMIN — DIPHENHYDRAMINE HYDROCHLORIDE 25 MG: 25 CAPSULE ORAL at 12:04

## 2025-04-06 RX ADMIN — METHYLPREDNISOLONE SODIUM SUCCINATE 125 MG: 125 INJECTION, POWDER, FOR SOLUTION INTRAMUSCULAR; INTRAVENOUS at 09:04

## 2025-04-06 RX ADMIN — OXYCODONE HYDROCHLORIDE 10 MG: 10 TABLET ORAL at 11:04

## 2025-04-06 RX ADMIN — SERTRALINE HYDROCHLORIDE 25 MG: 25 TABLET ORAL at 08:04

## 2025-04-06 RX ADMIN — DIPHENHYDRAMINE HYDROCHLORIDE 25 MG: 25 CAPSULE ORAL at 03:04

## 2025-04-06 RX ADMIN — FAMOTIDINE 20 MG: 20 TABLET, FILM COATED ORAL at 09:04

## 2025-04-06 RX ADMIN — DOCUSATE SODIUM 200 MG: 100 CAPSULE, LIQUID FILLED ORAL at 08:04

## 2025-04-06 RX ADMIN — DIPHENHYDRAMINE HYDROCHLORIDE 25 MG: 25 CAPSULE ORAL at 06:04

## 2025-04-06 RX ADMIN — ENOXAPARIN SODIUM 40 MG: 40 INJECTION SUBCUTANEOUS at 08:04

## 2025-04-06 RX ADMIN — DIPHENHYDRAMINE HYDROCHLORIDE 25 MG: 25 CAPSULE ORAL at 08:04

## 2025-04-06 RX ADMIN — IBUPROFEN 800 MG: 400 TABLET ORAL at 02:04

## 2025-04-06 RX ADMIN — PRENATAL VIT W/ FE FUMARATE-FA TAB 27-0.8 MG 1 TABLET: 27-0.8 TAB at 08:04

## 2025-04-06 RX ADMIN — ACETAMINOPHEN 650 MG: 325 TABLET, FILM COATED ORAL at 10:04

## 2025-04-06 RX ADMIN — HYDROCORTISONE: 10 OINTMENT TOPICAL at 06:04

## 2025-04-06 RX ADMIN — OXYCODONE HYDROCHLORIDE 5 MG: 5 TABLET ORAL at 01:04

## 2025-04-06 RX ADMIN — ACETAMINOPHEN 650 MG: 325 TABLET, FILM COATED ORAL at 04:04

## 2025-04-06 RX ADMIN — IBUPROFEN 800 MG: 400 TABLET ORAL at 10:04

## 2025-04-06 RX ADMIN — WHITE PETROLATUM: 1.75 OINTMENT TOPICAL at 09:04

## 2025-04-06 RX ADMIN — ACETAMINOPHEN 650 MG: 325 TABLET, FILM COATED ORAL at 05:04

## 2025-04-06 RX ADMIN — LEVOTHYROXINE SODIUM 50 MCG: 50 TABLET ORAL at 05:04

## 2025-04-06 RX ADMIN — ACETAMINOPHEN 650 MG: 325 TABLET, FILM COATED ORAL at 11:04

## 2025-04-06 RX ADMIN — IBUPROFEN 800 MG: 400 TABLET ORAL at 06:04

## 2025-04-06 NOTE — PROGRESS NOTES
POSTPARTUM PROGRESS NOTE    Subjective:     PPD/POD#: 2   Procedure: Primary LTCS   EGA: 38w1d   N/V: No   F/C: No   Abd Pain: Mild, well-controlled with oral pain medication   Lochia: Mild   Voiding: Yes   Ambulating: Yes   Bowel fnc: Yes, passing flatus   Contraception: POPs > OCPs     Patient reports worsening rash and itch overnight. She rash started in RLQ and spread to rest of anterior abdomen and back. Rash follows pattern of chlorhexadine prep. Denies symptoms of Pre-E and anemia at this time.     Objective:      Temp:  [97.8 °F (36.6 °C)-99.1 °F (37.3 °C)] 98.2 °F (36.8 °C)  Pulse:  [53-69] 69  Resp:  [17-18] 18  SpO2:  [95 %-96 %] 96 %  BP: (125-142)/(60-77) 131/63    Abdomen: Soft, appropriately tender. Erythematous maculopapular rash abdomen and back at sites of chlorhexadine prep   Uterus: Firm, no fundal tenderness   Incision: Bandage in place without shadowing               Lab Review    Recent Labs   Lab 04/03/25  1211 04/04/25  0450   *  --    K 4.5  --      --    CO2 17*  --    BUN 16  --    CREATININE 0.8  --    BILITOT 0.3  --    ALKPHOS 259*  --    ALT 21  --    AST 25  --    MG  --  7.1*       Recent Labs   Lab 04/03/25  1211 04/05/25  0625   WBC 7.29 17.24*   HGB 13.4 12.7   HCT 38.0 35.5*   MCV 94 93   *  --      I/O    Intake/Output Summary (Last 24 hours) at 4/6/2025 1412  Last data filed at 4/6/2025 0035  Gross per 24 hour   Intake --   Output 700 ml   Net -700 ml        Assessment and Plan:   Postpartum care:  - Patient doing well  - Post partum CBC stable   - Continue routine management and advances.  - Continue Lovenox 40 qd (AMA + C/S + PreE w/ SF)    Rash  - Pruritic, erythematous maculopapular rash at sites of chlorhexadine prep on abdomen and back  - Minimal improvement with Benadryl  - Added cortisone ointment for topical use BID  - Will continue to monitor    PreE w/SF  - BP as above  - asymptomatic  - preE labs as above  - Mag: s/p Mag  - Hypertensive agent :  Procardia 30 XL initiated 4/4, held in the setting of normotensive BP    AMA  - encourage ambulation  - post-partum lovenox: 40 mg qD    Hypothyroidism  - continue home synthroid    Anxiety/Depression  - Mood stable  - Medications: continue zoloft   - Will need 1-2 week postpartum mood check    GTP  - asymptomatic   - PLT: 123 > 117     Lorraine Kumar MD  Obstetrics & Gynecology, PGY-1

## 2025-04-06 NOTE — CARE UPDATE
Resident to bedside for rash assessment.     Per nursing, pt has experienced worsening rash since this am. Hydrocortisone cream did not help. Pt endorses general skin discomfort along abdomen and lower back. Denies any headaches, SOB, throat itching, or facial swelling. Otherwise no other complaints outside of minimal pedal edema well controlled with compression socks.     Temp:  [97.8 °F (36.6 °C)-99.1 °F (37.3 °C)] 98.8 °F (37.1 °C)  Pulse:  [53-69] 68  Resp:  [17-18] 18  SpO2:  [95 %-97 %] 97 %  BP: (125-142)/(60-81) 141/81    PE:   Gen: Well appearing   Integumentary: Well demarcated region along abdomen and midline spine in area of chlorhexidine washing, skin raised with small boils throughout           Plan:   -will schedule Benadryl nightly / zyrtec daily for general antihistamine response   - Aquaphor to bedside for patient to self-apply PRN   - Wound care consult placed for AM     Diane Lima MD (Mary)   Obstetrics and Gynecology, PGY1

## 2025-04-06 NOTE — DISCHARGE SUMMARY
"Delivery Discharge Summary  Obstetrics      Primary OB Clinician: Pravin Knowles MD      Admission date: 4/3/2025  Discharge date: 2025    Disposition: To home, self care    Discharge Diagnosis List:    Problem List[1]    Procedure:     Hospital Course:  Aurora Pringle is a 44 y.o. now , POD #*** who was admitted on 4/3/2025 for new diagnosis of preE w/SF at 38w and admitted for IOL and magnesium. Labor was complicated by arrest of descent, and decision was made to proceed with delivery via . Please see delivery note for further details. She was started on procardia 30 for BP control ***. Her postpartum course was complicated by allergic rash to chlorhexidine, that was treated with topical hydrocortisone with improvement. On discharge day, patient's pain is controlled with oral pain medications. Pt is tolerating ambulation without SOB or CP, and regular diet without N/V. Reports lochia is mild. Denies any HA, vision changes, F/C, LE swelling. Denies any breast pain/soreness.    Pt in stable condition and ready for discharge. She has been instructed to start and/or continue medications and follow up with her obstetrics provider as listed below.    Pertinent studies:  CBC  Recent Labs   Lab 25  1211 25  0625   WBC 7.29 17.24*   HGB 13.4 12.7   HCT 38.0 35.5*   MCV 94 93   *  --           Immunization History   Administered Date(s) Administered    Tdap 2025        Delivery:    Episiotomy: None   Lacerations: None   Repair suture: None   Repair # of packets:     Blood loss (ml):       Birth information:  YOB: 2024   Time of birth: 12:56 PM   Sex: female   Delivery type: , Low Transverse   Gestational Age: 38w1d     Measurements    Weight: 3060 g  Weight (lbs): 6 lb 11.9 oz  Length: 47 cm  Length (in): 18.5"  Head circumference: 35.5 cm  Chest circumference: 33.5 cm         Delivery Clinician: Delivery Providers    Delivering " clinician: Pravin Knowles MD   Provider Role    Marianne Morfin, Elham Thompson RN Beshenich, KRISTIN Lerma, KRISTIN Bajwa, Paul, ST     Jenkins, Modesta, ST              Additional  information:  Forceps:    Vacuum:    Breech:    Observed anomalies      Living?:     Apgars    Living status: Living  Apgar Component Scores:  1 min.:  5 min.:  10 min.:  15 min.:  20 min.:    Skin color:  1  1       Heart rate:  2  2       Reflex irritability:  2  2       Muscle tone:  2  2       Respiratory effort:  2  2       Total:  9  9       Apgars assigned by: MARIO JUAREZ         Placenta: Delivered:       appearance    Patient Instructions:   Current Discharge Medication List        START taking these medications    Details   acetaminophen (TYLENOL) 650 MG TbSR Take 1 tablet (650 mg total) by mouth every 8 (eight) hours.  Qty: 30 tablet, Refills: 0      docusate sodium (COLACE) 100 MG capsule Take 1 capsule (100 mg total) by mouth 2 (two) times daily.  Qty: 30 capsule, Refills: 1      ibuprofen (ADVIL,MOTRIN) 600 MG tablet Take 1 tablet (600 mg total) by mouth every 6 (six) hours as needed.  Qty: 30 tablet, Refills: 1      oxyCODONE (ROXICODONE) 5 MG immediate release tablet Take 1 tablet (5 mg total) by mouth every 4 (four) hours as needed.  Qty: 20 tablet, Refills: 0    Comments: Quantity prescribed more than 7 day supply? No  Associated Diagnoses: S/P primary low transverse            CONTINUE these medications which have NOT CHANGED    Details   calcium carbonate (TUMS ORAL) Take by mouth.      cholecalciferol, vitamin D3, (VITAMIN D3) 50 mcg (2,000 unit) Cap capsule 1 tablet Orally Once daily      EPINEPHrine (EPIPEN) 0.3 mg/0.3 mL AtIn       famotidine (PEPCID ORAL) Take by mouth.      latanoprost 0.005 % ophthalmic solution Place 1 drop into both eyes every evening.      LUMIGAN 0.01 % Drop 1 drop.      magnesium citrate 125 mg Cap Take by mouth.      PRENATAL  118-IRON-FOLATE 6-DHA ORAL Take by mouth.      !! sertraline (ZOLOFT) 25 MG tablet       !! sertraline (ZOLOFT) 50 MG tablet Take 1 tablet by mouth once daily.      SYNTHROID 100 mcg tablet Take 1 tablet (100 mcg total) by mouth every morning.  Qty: 90 tablet, Refills: 3       !! - Potential duplicate medications found. Please discuss with provider.        STOP taking these medications       aspirin 81 mg Cap Comments:   Reason for Stopping:               Discharge Procedure Orders   Diet Adult Regular     Lifting restrictions   Order Comments: No lifting anything larger than baby for 6 weeks     No driving until:   Order Comments: No driving while taking narcotics     Pelvic Rest   Order Comments: Until seen in clinic     Notify your health care provider if you experience any of the following:  temperature >100.4     Notify your health care provider if you experience any of the following:  persistent nausea and vomiting or diarrhea     Notify your health care provider if you experience any of the following:  severe uncontrolled pain     Notify your health care provider if you experience any of the following:  redness, tenderness, or signs of infection (pain, swelling, redness, odor or green/yellow discharge around incision site)     Notify your health care provider if you experience any of the following:  difficulty breathing or increased cough     Notify your health care provider if you experience any of the following:  severe persistent headache     Notify your health care provider if you experience any of the following:  persistent dizziness, light-headedness, or visual disturbances     Activity as tolerated        Follow-up Information       Pravin Knowles MD Follow up in 3 day(s).    Specialty: Obstetrics and Gynecology  Why: Post partum blood pressure check  Contact information:  [address] [address]  [phone]               Pravin Knowles MD Follow up in 6 week(s).     Specialty: Obstetrics and Gynecology  Why: Post partum check  Contact information:  [address] [facility] [address]  [facility] [address]  [phone]                                    [1]   Patient Active Problem List  Diagnosis    Multigravida of advanced maternal age in second trimester    Pregnancy conceived through ART (IVF)    Pre-eclampsia with severe features affecting childbirth    S/P primary low transverse     Bradycardia    Hypothyroid    Anxiety    Benign gestational thrombocytopenia in third trimester

## 2025-04-06 NOTE — LACTATION NOTE
"   04/06/25 1615   Maternal Assessment   Breast Shape Bilateral:;round   Breast Density Bilateral:;soft;filling   Areola Bilateral:;elastic   Left Nipple Symptoms tender   Right Nipple Symptoms tender   Maternal Infant Feeding   Maternal Emotional State assist needed   Infant Positioning cross-cradle   Signs of Milk Transfer audible swallow;infant jaw motion present   Pain with Feeding yes  ("tender" to "0")   Pain Location nipples, bilateral   Pain Description soreness   Nipple Shape After Feeding, Left round   Latch Assistance yes   Breast Pumping   Breast Pumping Interventions post-feed pumping encouraged  (if baby still hungry after nursing)   Breast Pumping hand expression utilized       "

## 2025-04-06 NOTE — PLAN OF CARE
Lactation note:  To room to assist with breastfeeding. Mom reports breastfeeding for about 2 hours now. Baby sucking actively with stimulation and swallowing heard. Mom shown breast compression to assist with feeding. Infant appears sleepy, so detached from breast but still giving cues. Mom assisted with hand expression to feed baby more and then assisted with latching back to breast until infant asleep. Offered further assist as needed. Discussed pumping extra after nursing to offer more milk. Mom to continue to feed baby with cues 8 or more times in 24 hours.  phone number on board.   no

## 2025-04-07 PROBLEM — T78.40XA ALLERGIC RASH PRESENT ON EXAMINATION: Status: ACTIVE | Noted: 2025-04-07

## 2025-04-07 LAB
RUBV IGG SER-ACNC: 15.7 IU/ML
RUBV IGG SER-IMP: REACTIVE

## 2025-04-07 PROCEDURE — 25000003 PHARM REV CODE 250

## 2025-04-07 PROCEDURE — 63600175 PHARM REV CODE 636 W HCPCS: Mod: JZ,TB

## 2025-04-07 PROCEDURE — 63600175 PHARM REV CODE 636 W HCPCS

## 2025-04-07 PROCEDURE — 11000001 HC ACUTE MED/SURG PRIVATE ROOM

## 2025-04-07 RX ORDER — DIPHENHYDRAMINE HCL 25 MG
25 CAPSULE ORAL EVERY 6 HOURS
Status: DISCONTINUED | OUTPATIENT
Start: 2025-04-07 | End: 2025-04-08 | Stop reason: HOSPADM

## 2025-04-07 RX ORDER — NIFEDIPINE 10 MG/1
10 CAPSULE ORAL ONCE
Status: COMPLETED | OUTPATIENT
Start: 2025-04-07 | End: 2025-04-07

## 2025-04-07 RX ORDER — NIFEDIPINE 60 MG/1
60 TABLET, EXTENDED RELEASE ORAL DAILY
Qty: 30 TABLET | Refills: 11 | Status: SHIPPED | OUTPATIENT
Start: 2025-04-08 | End: 2026-04-08

## 2025-04-07 RX ORDER — DIPHENHYDRAMINE HYDROCHLORIDE 50 MG/ML
25 INJECTION, SOLUTION INTRAMUSCULAR; INTRAVENOUS EVERY 8 HOURS
Status: DISCONTINUED | OUTPATIENT
Start: 2025-04-07 | End: 2025-04-07

## 2025-04-07 RX ORDER — NIFEDIPINE 30 MG/1
30 TABLET, EXTENDED RELEASE ORAL ONCE
Status: COMPLETED | OUTPATIENT
Start: 2025-04-07 | End: 2025-04-07

## 2025-04-07 RX ORDER — NIFEDIPINE 30 MG/1
30 TABLET, EXTENDED RELEASE ORAL DAILY
Status: DISCONTINUED | OUTPATIENT
Start: 2025-04-07 | End: 2025-04-07

## 2025-04-07 RX ORDER — HYDROPHILIC CREAM
170 PASTE (GRAM) TOPICAL 2 TIMES DAILY PRN
Qty: 170 G | Refills: 1 | Status: SHIPPED | OUTPATIENT
Start: 2025-04-07

## 2025-04-07 RX ORDER — NIFEDIPINE 30 MG/1
60 TABLET, EXTENDED RELEASE ORAL DAILY
Status: DISCONTINUED | OUTPATIENT
Start: 2025-04-08 | End: 2025-04-08 | Stop reason: HOSPADM

## 2025-04-07 RX ORDER — DIPHENHYDRAMINE HCL 25 MG
25 CAPSULE ORAL EVERY 6 HOURS
Qty: 15 CAPSULE | Refills: 0 | Status: SHIPPED | OUTPATIENT
Start: 2025-04-07

## 2025-04-07 RX ADMIN — ACETAMINOPHEN 650 MG: 325 TABLET, FILM COATED ORAL at 11:04

## 2025-04-07 RX ADMIN — NIFEDIPINE 10 MG: 10 CAPSULE ORAL at 04:04

## 2025-04-07 RX ADMIN — PRENATAL VIT W/ FE FUMARATE-FA TAB 27-0.8 MG 1 TABLET: 27-0.8 TAB at 08:04

## 2025-04-07 RX ADMIN — DIPHENHYDRAMINE HYDROCHLORIDE 25 MG: 25 CAPSULE ORAL at 12:04

## 2025-04-07 RX ADMIN — ACETAMINOPHEN 650 MG: 325 TABLET, FILM COATED ORAL at 06:04

## 2025-04-07 RX ADMIN — FAMOTIDINE 20 MG: 20 TABLET, FILM COATED ORAL at 02:04

## 2025-04-07 RX ADMIN — ACETAMINOPHEN 650 MG: 325 TABLET, FILM COATED ORAL at 12:04

## 2025-04-07 RX ADMIN — NIFEDIPINE 30 MG: 30 TABLET, FILM COATED, EXTENDED RELEASE ORAL at 12:04

## 2025-04-07 RX ADMIN — METHYLPREDNISOLONE SODIUM SUCCINATE 80 MG: 125 INJECTION, POWDER, FOR SOLUTION INTRAMUSCULAR; INTRAVENOUS at 02:04

## 2025-04-07 RX ADMIN — METHYLPREDNISOLONE SODIUM SUCCINATE 80 MG: 125 INJECTION, POWDER, FOR SOLUTION INTRAMUSCULAR; INTRAVENOUS at 06:04

## 2025-04-07 RX ADMIN — NIFEDIPINE 30 MG: 30 TABLET, FILM COATED, EXTENDED RELEASE ORAL at 08:04

## 2025-04-07 RX ADMIN — SERTRALINE HYDROCHLORIDE 25 MG: 25 TABLET ORAL at 08:04

## 2025-04-07 RX ADMIN — DOCUSATE SODIUM 200 MG: 100 CAPSULE, LIQUID FILLED ORAL at 08:04

## 2025-04-07 RX ADMIN — ENOXAPARIN SODIUM 40 MG: 40 INJECTION SUBCUTANEOUS at 08:04

## 2025-04-07 RX ADMIN — DIPHENHYDRAMINE HYDROCHLORIDE 25 MG: 25 CAPSULE ORAL at 11:04

## 2025-04-07 RX ADMIN — DIPHENHYDRAMINE HYDROCHLORIDE 25 MG: 25 CAPSULE ORAL at 06:04

## 2025-04-07 RX ADMIN — LEVOTHYROXINE SODIUM 50 MCG: 50 TABLET ORAL at 06:04

## 2025-04-07 RX ADMIN — FAMOTIDINE 20 MG: 20 TABLET, FILM COATED ORAL at 06:04

## 2025-04-07 RX ADMIN — IBUPROFEN 800 MG: 400 TABLET ORAL at 10:04

## 2025-04-07 RX ADMIN — IBUPROFEN 800 MG: 400 TABLET ORAL at 05:04

## 2025-04-07 RX ADMIN — IBUPROFEN 800 MG: 400 TABLET ORAL at 01:04

## 2025-04-07 RX ADMIN — FAMOTIDINE 20 MG: 20 TABLET, FILM COATED ORAL at 09:04

## 2025-04-07 NOTE — LACTATION NOTE
Lactation visited pt. Latch assistance given, minimal help needed. Baby latched easily on the right and left breast using cross cradle on the left and football on the right. Baby had good swallows during the feeding, breast compression used to keep the baby feeding. Breastfeeding discharge education reviewed via breastfeeding guide.If the baby needs supplementation, pt aware to use the Symphony pump or her personal pump at home for additional stimulation and protect her milk supply. Pt to follow up with her ped in regards to feeding once she and the baby are discharged.  Questions answered. Breastfeeding resources given to contact after discharge. Pt verbalized understanding.    04/07/25 1130   Maternal Assessment   Breast Shape Bilateral:;round   Breast Density Bilateral:;filling   Areola Bilateral:;elastic   Maternal Infant Feeding   Maternal Emotional State assist needed   Infant Positioning clutch/football;cross-cradle   Signs of Milk Transfer audible swallow;infant jaw motion present   Pain with Feeding no   Nipple Shape After Feeding, Right everted round   Latch Assistance other (see comments)  (minimal assist needed)   Equipment Type   Breast Pump Type double electric, hospital grade   Breast Pump Flange Type hard   Breast Pump Flange Size 21 mm   Breast Pumping   Breast Pumping Interventions   (encourgaed to pump if pt supplements the  baby.)

## 2025-04-07 NOTE — CARE UPDATE
To bedside to discuss Bps and further management    I explained to patient that we are seeing a rebound in her elevated Bps as she is at the 5 day post partum erich which is part of the pathophysiology of the disease. She thinks her Bps won't decrease while she is here in the hospital and that she can check them at home with her conn mom cuff. I discussed that it would be against medical advice to leave the hospital with severe range Bps. We have increased her BP med to Procardia 60XL today. I recommended that she stay overnight for further monitoring with DC tomorrow and subsequent next day appt on 4/9 with her primary obgyn. Explained the risks of going home with severe Bps. She elects to stay overnight for further monitoring. Will move rooms so that she has a recliner for additional comfort - D/W charge nurse.     Patient in agreement with plan.

## 2025-04-07 NOTE — PLAN OF CARE
Patient safety maintained, side rails up x2, bed low and locked position. Pt ambulating and voiding independently.  Pain controlled with PRN and scheduled pain medication. Fundus midline, firm, with light lochia. Patient responding to infant cues.  Hydrocolloid dressing clean, dry, and intact. No new worsening rash.

## 2025-04-07 NOTE — CONSULTS
[facility]  Wound Care    Patient Name:  Aurora Pringle   MRN:  [id number]  Date: 2025  Diagnosis: S/P primary low transverse     History:     Past Medical History:   Diagnosis Date     product of IVF pregnancy        Social History[1]        [facility] Assessment Details:     Allergic reaction to CHG, rash is hot, red with crusting clusters on the RLQ and on the sacral spine.  Intact fluid filled blisters on the LLQ.  Triad applied to entirety of rash focusing on the RLQ, LLQ, and sacral Spine.  Education provided with supply for home use, patient has a very good knowledge base of basic wound care and therapies, Recs for continuation of pharmaceutical interventions per Team.  Orders entered.     25 1054        Wound 25 0915 Rash medial Abdomen   Date First Assessed/Time First Assessed: 25   Present on Original Admission: No  Primary Wound Type: Rash  Orientation: medial  Location: (c) Abdomen   Wound Image     Configuration/Shape symmetric;asymmetric   Groupings isolated   Borders active   Characteristics burning;crusting;redness/erythema;serous;dryness;pain/discomfort   Color deep red   Rash Care barrier applied;skin barrier applied;other (see comments)  (hydrophilic paste, Triad)     2025         [1]   Social History  Socioeconomic History    Marital status: Single   Tobacco Use    Smoking status: Never    Smokeless tobacco: Never   Substance and Sexual Activity    Alcohol use: Not Currently    Drug use: Never    Sexual activity: Not Currently     Birth control/protection: None     Social Drivers of Health     Financial Resource Strain: Low Risk  (7/10/2024)    Received from [facility]    Overall Financial Resource Strain (CARDIA)     Difficulty of Paying Living Expenses: Not hard at all   Food Insecurity: No Food Insecurity (7/10/2024)    Received from [facility]    Hunger Vital Sign     Worried About Running Out of Food in the Last Year: Never  true     Ran Out of Food in the Last Year: Never true   Transportation Needs: No Transportation Needs (7/10/2024)    Received from [facility]    PRAPARE - Transportation     Lack of Transportation (Medical): No     Lack of Transportation (Non-Medical): No   Physical Activity: Sufficiently Active (7/10/2024)    Received from [facility]    Exercise Vital Sign     Days of Exercise per Week: 5 days     Minutes of Exercise per Session: 40 min   Stress: Stress Concern Present (7/10/2024)    Received from [facility]    [employer] [employer] - Occupational Stress Questionnaire     Feeling of Stress : Rather much   Housing Stability: Low Risk  (7/10/2024)    Received from [facility]    Housing Stability Vital Sign     Unable to Pay for Housing in the Last Year: No     Number of Places Lived in the Last Year: 2     Unstable Housing in the Last Year: No

## 2025-04-07 NOTE — PROGRESS NOTES
POSTPARTUM PROGRESS NOTE    Subjective:     PPD/POD#: 3   Procedure: Primary LTCS   EGA: 38w1d   N/V: No   F/C: No   Abd Pain: Mild, well-controlled with oral pain medication   Lochia: Mild   Voiding: Yes   Ambulating: Yes   Bowel fnc: Yes, passing flatus   Contraception: POPs > OCPs     Patient continues to reports rash and pruritus overnight. She states the boils have decreased and thus the drainage since solumedrol administration; however, it has been uncomfortable throughout the night and difficult to sleep. Denies symptoms of Pre-E and anemia at this time.     Objective:      Temp:  [98 °F (36.7 °C)-99.1 °F (37.3 °C)] 98.3 °F (36.8 °C)  Pulse:  [61-72] 64  Resp:  [17-18] 18  SpO2:  [93 %-97 %] 93 %  BP: (131-159)/(63-84) 154/74    Abdomen: Soft, appropriately tender. Erythematous, well-demarcated maculopapular rash abdomen and back at sites of chlorhexadine prep   Uterus: Firm, no fundal tenderness   Incision: Bandage in place without shadowing     Lab Review    Recent Labs   Lab 04/03/25  1211 04/04/25  0450   *  --    K 4.5  --      --    CO2 17*  --    BUN 16  --    CREATININE 0.8  --    BILITOT 0.3  --    ALKPHOS 259*  --    ALT 21  --    AST 25  --    MG  --  7.1*       Recent Labs   Lab 04/03/25  1211 04/05/25  0625   WBC 7.29 17.24*   HGB 13.4 12.7   HCT 38.0 35.5*   MCV 94 93   *  --      I/O  No intake or output data in the 24 hours ending 04/07/25 0633       Assessment and Plan:   Postpartum care:  - Patient doing well  - Post partum CBC stable   - Continue routine management and advances.  - Continue Lovenox 40 qd (AMA + C/S + PreE w/ SF)    Rash  - Pruritic, erythematous maculopapular rash at sites of chlorhexadine prep on abdomen and back  - Minimal improvement with Benadryl and Hydrocortisone treatment  - Discussed with HM who recommend solumedrol, pepcid and benadryl > all ordered with no need to taper steroid treatment. Will contact dermatology if no improvement in rash.  -  Will continue to monitor    PreE w/SF  - BP as above  - asymptomatic  - preE labs as above  - Mag: s/p Mag  - Hypertensive agent : Procardia 30 XL initiated 4/4, held in the setting of normotensive BP, restarted today given BP overnight (4/7)    AMA  - encourage ambulation  - post-partum lovenox: 40 mg qD    Hypothyroidism  - continue home synthroid    Anxiety/Depression  - Mood stable  - Medications: continue zoloft   - Will need 1-2 week postpartum mood check    GTP  - asymptomatic   - PLT: 123 > 117       Machelle Raza MD PGY2  Obstetrics and Gynecology

## 2025-04-07 NOTE — PROGRESS NOTES
Mother Baby Care Guide reviewed. Pt verbalized understanding that she will check her BP at least once a day when discharged via [facility]. Pt also verbalized understanding that she will call/come into the [facility] for any SS of HTN or for any severe range BP. Pt also verbalized understanding that she will follow up with her provider in two weeks for a mood check.

## 2025-04-08 VITALS
TEMPERATURE: 98 F | DIASTOLIC BLOOD PRESSURE: 73 MMHG | RESPIRATION RATE: 18 BRPM | HEART RATE: 68 BPM | OXYGEN SATURATION: 97 % | SYSTOLIC BLOOD PRESSURE: 144 MMHG | WEIGHT: 192.25 LBS

## 2025-04-08 PROCEDURE — 25000003 PHARM REV CODE 250

## 2025-04-08 RX ORDER — FUROSEMIDE 20 MG/1
20 TABLET ORAL DAILY
Status: DISCONTINUED | OUTPATIENT
Start: 2025-04-08 | End: 2025-04-08 | Stop reason: HOSPADM

## 2025-04-08 RX ORDER — FUROSEMIDE 20 MG/1
20 TABLET ORAL DAILY
Qty: 5 TABLET | Refills: 0 | Status: SHIPPED | OUTPATIENT
Start: 2025-04-08 | End: 2025-04-13

## 2025-04-08 RX ADMIN — NIFEDIPINE 60 MG: 30 TABLET, FILM COATED, EXTENDED RELEASE ORAL at 08:04

## 2025-04-08 RX ADMIN — FUROSEMIDE 20 MG: 20 TABLET ORAL at 01:04

## 2025-04-08 RX ADMIN — ACETAMINOPHEN 650 MG: 325 TABLET, FILM COATED ORAL at 11:04

## 2025-04-08 RX ADMIN — DIPHENHYDRAMINE HYDROCHLORIDE 25 MG: 25 CAPSULE ORAL at 05:04

## 2025-04-08 RX ADMIN — FUROSEMIDE 20 MG: 20 TABLET ORAL at 08:04

## 2025-04-08 RX ADMIN — FAMOTIDINE 20 MG: 20 TABLET, FILM COATED ORAL at 05:04

## 2025-04-08 RX ADMIN — IBUPROFEN 800 MG: 400 TABLET ORAL at 09:04

## 2025-04-08 RX ADMIN — SERTRALINE HYDROCHLORIDE 25 MG: 25 TABLET ORAL at 08:04

## 2025-04-08 RX ADMIN — DIPHENHYDRAMINE HYDROCHLORIDE 25 MG: 25 CAPSULE ORAL at 11:04

## 2025-04-08 RX ADMIN — DOCUSATE SODIUM 200 MG: 100 CAPSULE, LIQUID FILLED ORAL at 08:04

## 2025-04-08 RX ADMIN — ACETAMINOPHEN 650 MG: 325 TABLET, FILM COATED ORAL at 05:04

## 2025-04-08 RX ADMIN — PRENATAL VIT W/ FE FUMARATE-FA TAB 27-0.8 MG 1 TABLET: 27-0.8 TAB at 08:04

## 2025-04-08 RX ADMIN — IBUPROFEN 800 MG: 400 TABLET ORAL at 01:04

## 2025-04-08 RX ADMIN — LEVOTHYROXINE SODIUM 50 MCG: 50 TABLET ORAL at 05:04

## 2025-04-08 NOTE — CARE UPDATE
Resident to bedside to assess ankle swelling     Per nursing, pt reports increased foot swelling throughout today. At bedside, pt reports it is now difficult for her to bend her ankles. Otherwise denies any complaints of SOB, dizziness, chest pain, RUQ pain, or vision changes     Temp:  [97.7 °F (36.5 °C)-98.6 °F (37 °C)] 98.6 °F (37 °C)  Pulse:  [54-76] 72  Resp:  [18] 18  SpO2:  [93 %-97 %] 95 %  BP: (133-167)/(72-95) 135/76    Recent Labs   Lab 04/03/25  1211 04/05/25  0625   WBC 7.29 17.24*   HGB 13.4 12.7   HCT 38.0 35.5*   MCV 94 93   *  --         Recent Labs   Lab 04/03/25  1211 04/04/25  0450   *  --    K 4.5  --      --    CO2 17*  --    BUN 16  --    CREATININE 0.8  --    BILITOT 0.3  --    ALKPHOS 259*  --    ALT 21  --    AST 25  --    MG  --  7.1*        Focused physical exam:  Bilateral 2+ edema to mid calf     Plan:   -will initiate 5 day course of PO lasix for symptomatic edema   -will continue to monitor         Diane Lima MD (Mary)   Obstetrics and Gynecology, PGY1

## 2025-04-08 NOTE — DISCHARGE SUMMARY
Delivery Discharge Summary  Obstetrics      Primary OB Clinician: Pravin Knowles MD      Admission date: 4/3/2025  Discharge date: 2025    Disposition: To home, self care    Discharge Diagnosis List:    Problem List[1]    Procedure: , due to arrest of descent    Hospital Course:  Aurora Pringle is a 44 y.o. now , POD #4 who was admitted on 4/3/2025 at 38w1d for IOL 2/2 newly diagnosed PreE w/ SF (BP). Patient was subsequently admitted to labor and delivery unit with signed consents. She was initiated on MgSO4 for maternal seizure prophylaxis.     This IUP was complicated by Pre E with SF (BP), AMA, IVF pregnancy, Anxiety/Depression, GTP, EIF, Hypothyroid.     Labor course was complicated by arrest of descent, and decision was made to proceed with delivery via  which was performed without complications.    Please see delivery note for further details. Her postpartum course was complicated by a profuse abdominal and lower back rash, likely from chlorhexidine use prior to procedure. Discussed case with HM and patient was initially started on solumedrol, pepcid, and benadryl. Solumedrol then discontinued given concern of possible chemical burn and recommendation for topical agents instead. Wound care evaluated at bedside and triad was applied which patient reports has been providing mild relief. Please see images in media for rash. She was also initiated on antihypertensives and BP stable on Procardia 60XL daily. On discharge day, patient's pain is controlled with oral pain medications. Pt is tolerating ambulation without SOB or CP, and regular diet without N/V. Reports lochia is mild. Denies any HA, vision changes, F/C, LE swelling. Denies any breast pain/soreness.    Pt in stable condition and ready for discharge. She has been instructed to start and/or continue medications and follow up with her obstetrics provider as listed below. Outpatient dermatology referral also placed  "for rash.    Pertinent studies:  CBC  Recent Labs   Lab 25  1211 25  0625   WBC 7.29 17.24*   HGB 13.4 12.7   HCT 38.0 35.5*   MCV 94 93   *  --         Immunization History   Administered Date(s) Administered    Tdap 2025        Delivery:    Episiotomy: None   Lacerations: None   Repair suture: None   Repair # of packets:     Blood loss (ml):       Birth information:  YOB: 2024   Time of birth: 12:56 PM   Sex: female   Delivery type: , Low Transverse   Gestational Age: 38w1d     Measurements    Weight: 3060 g  Weight (lbs): 6 lb 11.9 oz  Length: 47 cm  Length (in): 18.5"  Head circumference: 35.5 cm  Chest circumference: 33.5 cm         Delivery Clinician: Delivery Providers    Delivering clinician: Pravin Knowles MD   Provider Role    Band, DO Mathew Mccall Emily A, RN Beshenich, Kayla, RN Hilkirk, Jennifer, RN Toups, Jamie, [facility], [facility]              Additional  information:  Forceps:    Vacuum:    Breech:    Observed anomalies      Living?:     Apgars    Living status: Living  Apgar Component Scores:  1 min.:  5 min.:  10 min.:  15 min.:  20 min.:    Skin color:  1  1       Heart rate:  2  2       Reflex irritability:  2  2       Muscle tone:  2  2       Respiratory effort:  2  2       Total:  9  9       Apgars assigned by: MARIO JUAREZ         Placenta: Delivered:       appearance    Patient Instructions:   Current Discharge Medication List        START taking these medications    Details   acetaminophen (TYLENOL) 650 MG TbSR Take 1 tablet (650 mg total) by mouth every 8 (eight) hours.  Qty: 30 tablet, Refills: 0      cholestyramine/aspartame (QUESTRAN AND AQUAPHOR TOPICAL COMPOUND) Apply 500 g topically 3 (three) times daily.  Qty: 50 g, Refills: 1      diphenhydrAMINE (BENADRYL) 25 mg capsule Take 1 capsule (25 mg total) by mouth every 6 (six) hours.  Qty: 15 capsule, Refills: 0      docusate sodium (COLACE) 100 MG " capsule Take 1 capsule (100 mg total) by mouth 2 (two) times daily.  Qty: 30 capsule, Refills: 1      furosemide (LASIX) 20 MG tablet Take 1 tablet (20 mg total) by mouth once daily. for 5 days  Qty: 5 tablet, Refills: 0      ibuprofen (ADVIL,MOTRIN) 600 MG tablet Take 1 tablet (600 mg total) by mouth every 6 (six) hours as needed.  Qty: 30 tablet, Refills: 1      NIFEdipine (PROCARDIA-XL) 60 MG (OSM) 24 hr tablet Take 1 tablet (60 mg total) by mouth once daily.  Qty: 30 tablet, Refills: 11    Comments: .      oxyCODONE (ROXICODONE) 5 MG immediate release tablet Take 1 tablet (5 mg total) by mouth every 4 (four) hours as needed.  Qty: 20 tablet, Refills: 0    Comments: Quantity prescribed more than 7 day supply? No  Associated Diagnoses: S/P primary low transverse       wound dressings (TRIAD WOUND DRESSING) Pste Apply 170 g topically 2 (two) times daily as needed.  Qty: 170 g, Refills: 1           CONTINUE these medications which have NOT CHANGED    Details   calcium carbonate (TUMS ORAL) Take by mouth.      cholecalciferol, vitamin D3, (VITAMIN D3) 50 mcg (2,000 unit) Cap capsule 1 tablet Orally Once daily      EPINEPHrine (EPIPEN) 0.3 mg/0.3 mL AtIn       famotidine (PEPCID ORAL) Take by mouth.      latanoprost 0.005 % ophthalmic solution Place 1 drop into both eyes every evening.      LUMIGAN 0.01 % Drop 1 drop.      magnesium citrate 125 mg Cap Take by mouth.      PRENATAL 118-IRON-FOLATE 6-DHA ORAL Take by mouth.      !! sertraline (ZOLOFT) 25 MG tablet       !! sertraline (ZOLOFT) 50 MG tablet Take 1 tablet by mouth once daily.      SYNTHROID 100 mcg tablet Take 1 tablet (100 mcg total) by mouth every morning.  Qty: 90 tablet, Refills: 3       !! - Potential duplicate medications found. Please discuss with provider.        STOP taking these medications       aspirin 81 mg Cap Comments:   Reason for Stopping:               Discharge Procedure Orders   Ambulatory referral/consult to Dermatology    Standing Status: Future   Referral Priority: Routine Referral Type: Consultation   Referral Reason: Specialty Services Required   Requested Specialty: Dermatology   Number of Visits Requested: 1     Ambulatory referral/consult to Dermatology   Standing Status: Future   Referral Priority: Routine Referral Type: Consultation   Referral Reason: Specialty Services Required   Requested Specialty: Dermatology   Number of Visits Requested: 1     Diet Adult Regular     Lifting restrictions   Order Comments: No lifting anything larger than baby for 6 weeks     No driving until:   Order Comments: No driving while taking narcotics     Pelvic Rest   Order Comments: Until seen in clinic     Notify your health care provider if you experience any of the following:  temperature >100.4     Notify your health care provider if you experience any of the following:  persistent nausea and vomiting or diarrhea     Notify your health care provider if you experience any of the following:  severe uncontrolled pain     Notify your health care provider if you experience any of the following:  redness, tenderness, or signs of infection (pain, swelling, redness, odor or green/yellow discharge around incision site)     Notify your health care provider if you experience any of the following:  difficulty breathing or increased cough     Notify your health care provider if you experience any of the following:  severe persistent headache     Notify your health care provider if you experience any of the following:  persistent dizziness, light-headedness, or visual disturbances     Activity as tolerated        Follow-up Information       Pravin Knowles MD Follow up in 3 day(s).    Specialty: Obstetrics and Gynecology  Why: Post partum blood pressure check  Contact information:  [address] [place] [id number]  [phone]               Pravin Knowles MD Follow up in 6 week(s).    Specialty: Obstetrics and  Gynecology  Why: Post partum check  Contact information:  [address] [address]  [phone]                              Machelle Raza MD PGY2  Obstetrics and Gynecology           [1]   Patient Active Problem List  Diagnosis    Multigravida of advanced maternal age in second trimester    Pregnancy conceived through ART (IVF)    Pre-eclampsia with severe features affecting childbirth    S/P primary low transverse     Bradycardia    Hypothyroid    Anxiety    Benign gestational thrombocytopenia in third trimester    Allergic rash present on examination

## 2025-04-08 NOTE — PROGRESS NOTES
POSTPARTUM PROGRESS NOTE    Subjective:     PPD/POD#: 4   Procedure: Primary LTCS   EGA: 38w1d   N/V: No   F/C: No   Abd Pain: Mild, well-controlled with oral pain medication   Lochia: Mild   Voiding: Yes   Ambulating: Yes   Bowel fnc: Yes, passing flatus   Contraception: POPs > OCPs     Patient continues to reports rash and pruritus overnight albeit mildly improved. She states the boils have mildly decreased. Denies symptoms of Pre-E and anemia at this time.     Objective:      Temp:  [97.7 °F (36.5 °C)-98.6 °F (37 °C)] 97.7 °F (36.5 °C)  Pulse:  [54-76] 63  Resp:  [18] 18  SpO2:  [95 %-97 %] 96 %  BP: (133-167)/(72-95) 135/75    Abdomen: Soft, appropriately tender. Erythematous, well-demarcated maculopapular rash abdomen and back at sites of chlorhexadine prep   Uterus: Firm, no fundal tenderness   Incision: Bandage in place without shadowing     Lab Review    Recent Labs   Lab 04/03/25  1211 04/04/25  0450   *  --    K 4.5  --      --    CO2 17*  --    BUN 16  --    CREATININE 0.8  --    BILITOT 0.3  --    ALKPHOS 259*  --    ALT 21  --    AST 25  --    MG  --  7.1*       Recent Labs   Lab 04/03/25  1211 04/05/25  0625   WBC 7.29 17.24*   HGB 13.4 12.7   HCT 38.0 35.5*   MCV 94 93   *  --      I/O  No intake or output data in the 24 hours ending 04/08/25 0637       Assessment and Plan:   Postpartum care:  - Patient doing well  - Post partum CBC stable   - Continue routine management and advances.  - Continue Lovenox 40 qd (AMA + C/S + PreE w/ SF)    Rash  - Pruritic, erythematous maculopapular rash at sites of chlorhexadine prep on abdomen and back  - Minimal improvement with Benadryl and Hydrocortisone treatment  - Discussed with HM who report likely chemical burn, recommend topical agents  - Wound care consulted, triad applied. Appreciate their care.   - Will order outpatient dermatology referral if rash not healing appropriately    PreE w/SF  - BP as above  - asymptomatic  - preE labs as  above  - Mag: s/p Mag  - Hypertensive agent : Procardia 60 (4/7)  - Lasix 20mg qD x 5 days    AMA  - encourage ambulation  - post-partum lovenox: 40 mg qD    Hypothyroidism  - continue home synthroid    Anxiety/Depression  - Mood stable  - Medications: continue zoloft   - Will need 1-2 week postpartum mood check    GTP  - asymptomatic   - PLT: 123 > 117       Machelle Raza MD PGY2  Obstetrics and Gynecology

## 2025-04-08 NOTE — LACTATION NOTE
04/08/25 0900   Breasts WDL   Breast WDL WDL   Breast Pumping   Breast Pumping Interventions post-feed pumping encouraged   Breast Pumping double electric breast pump utilized   Maternal Feeding Assessment   Maternal Emotional State assist needed   Infant Positioning clutch/football   Signs of Milk Transfer audible swallow   Pain with Feeding no   Pain Location nipples, bilateral   Pain Description other (see comments)  (tender)   Comfort Measures Before/During Feeding infant position adjusted;latch adjusted;maternal position adjusted   Latch Assistance yes   Reproductive Interventions   Breast Care: Breastfeeding open to air   Breastfeeding Assistance feeding cue recognition promoted;feeding on demand promoted;assisted with positioning;infant latch-on verified;feeding session observed;supplemental feeding provided   Breastfeeding Support maternal rest encouraged;maternal nutrition promoted;maternal hydration promoted;lactation counseling provided;infant-mother separation minimized;encouragement provided;diary/feeding log utilized     Lactation note: Lactation rounds. LC assisted pt with positioning infant for optimal latch. Wide latch achieved. Infant nursing well with breast compression and stimulation. Pt to continue to nurse, pump and supplement. Call for assistance as needed.

## 2025-04-08 NOTE — PLAN OF CARE
Patient safety maintained, side rails up x2, bed low and locked position. Pt ambulating and voiding independently.  Pain well controlled with scheduled pain medication. Fundus midline, firm, with light lochia. Patient responding to infant cues.  Hydrocolloid dressing dry and intact.

## 2025-04-09 ENCOUNTER — POSTPARTUM VISIT (OUTPATIENT)
Dept: OBSTETRICS AND GYNECOLOGY | Facility: CLINIC | Age: 45
End: 2025-04-09
Payer: COMMERCIAL

## 2025-04-09 ENCOUNTER — PATIENT MESSAGE (OUTPATIENT)
Dept: OBSTETRICS AND GYNECOLOGY | Facility: OTHER | Age: 45
End: 2025-04-09
Payer: COMMERCIAL

## 2025-04-09 VITALS — DIASTOLIC BLOOD PRESSURE: 93 MMHG | SYSTOLIC BLOOD PRESSURE: 133 MMHG | WEIGHT: 188.25 LBS

## 2025-04-09 PROCEDURE — 99999 PR PBB SHADOW E&M-EST. PATIENT-LVL III: CPT | Mod: PBBFAC,,, | Performed by: STUDENT IN AN ORGANIZED HEALTH CARE EDUCATION/TRAINING PROGRAM

## 2025-04-09 NOTE — PROGRESS NOTES
Ms. Pringle presents today for BP check.  She denies headache, spots in her vision, or RUQ pain.  She is doing well.  She is taking prescribed Lasix PO for edema with mild improvement. Procardia 60 XL. Topical treatment for abdominal and back rash secondary to allergic reaction from chlorhexidine.    Vitals:    04/09/25 0946   BP: (!) 133/93        Gen: NAD  Heart: RRR  Lungs: CTA  Ext: LE edema slightly improved, reflexes 2+    Assessment: Ms. Pringle is 5 days postpartum from pLTCS for arrest of descent.  Pregnancy and hospitalization was complicated by severe PreE and rash.  BP stable today.   - Continue BP meds and complete 5 day course of lasix  - Discussed continued use of Connected MOM to verify BP readings and assess for hypo or hypertension  - Patient declines 2 week postpartum mood check, supportive mother is staying with her. She will reach out to clinic if she wishes to schedule. Okay to be virtual visit  - RTC for 6 week PP visit    Pravin Knowles

## 2025-04-21 ENCOUNTER — PATIENT MESSAGE (OUTPATIENT)
Dept: OBSTETRICS AND GYNECOLOGY | Facility: CLINIC | Age: 45
End: 2025-04-21
Payer: COMMERCIAL

## 2025-04-30 ENCOUNTER — PATIENT MESSAGE (OUTPATIENT)
Dept: LACTATION | Facility: CLINIC | Age: 45
End: 2025-04-30
Payer: COMMERCIAL

## 2025-05-01 ENCOUNTER — CLINICAL SUPPORT (OUTPATIENT)
Facility: CLINIC | Age: 45
End: 2025-05-01
Payer: COMMERCIAL

## 2025-05-01 VITALS — SYSTOLIC BLOOD PRESSURE: 133 MMHG | RESPIRATION RATE: 18 BRPM | HEART RATE: 75 BPM | DIASTOLIC BLOOD PRESSURE: 80 MMHG

## 2025-05-01 NOTE — PROGRESS NOTES
Post Visit Nursing Note  Maternal Note  In-Home Visit    Family [facility]s Consent: Yes    Home visit completed 2025. This is the initial visit to the home by a Family Connects nurse.     Return Visit Needed: No   (If yes) Return visit date:     Maternal History:    Aurora Pringle is a 44 y.o. female White, who delivered at [id number] via pLTCS.    Aurora Pringle experienced the following pregnancy and delivery complications during this most recent pregnancy: Pre E with SF (BP), AMA, IVF pregnancy, Anxiety/Depression, GTP, EIF, Hypothyroid, nuchal x1, and failure to progress, resulting in delivery via  section .    Vaccine History:   Immunization History   Administered Date(s) Administered    Tdap 2025       Maternal Assessment:    Vital Signs During Home Visit:   /80 (BP Location: Left arm, Patient Position: Sitting)   Pulse 75   Resp 18   LMP 2024   Breastfeeding Yes   [place]  Depression Scale (EPDS) During Home Visit: 7  Feeding: breast and bottle  Relationship Risk: 0  Substance Use Risk: 0  Contraception: Will discuss with provider  Plan for Additional Children: No    Subjective:  The patient is a 44-year-old female who gave birth to an infant girl via planned  section (pLTCS) on 2025 at 38 weeks and 1 day due to induction for preeclampsia. During delivery, the patient reached 10 cm dilation and pushed for 2.5 hours, after which a  was necessary due to failure to progress. The patient reported experiencing a chemical burn from chlorhexidine during the procedure. Prior medications included Lasix and Benadryl, which she is no longer taking. Currently, she is taking a prenatal vitamin (PNV), Procardia 60 XL, Synthroid, and occasionally Tylenol and ibuprofen for discomfort. A significant concern for the patient has been low breast milk production. During this visit, she reported currently producing approximately 3 ounces per breastfeeding session and  expressed relief at having adequate production. No other concerns were noted during the visit.  Objective:  Vital Signs: Stable   Breast Assessment: No signs of mastitis or engorgement; nipple condition intact with no signs of irritation.  Emotional State: Patient appears well-adjusted, expressed gratitude for support and reassurances regarding breastfeeding.  Assessment:  Postpartum assessment indicates that the patient is recovering well from the  section. Breastfeeding concerns have been addressed, and the patient is reassured about her current production levels. The patient's medication regimen is acknowledged, and there are no signs of adverse reactions. Postpartum recovery appears to be progressing positively without any immediate complications.  Plan:    Continue to monitor and support breastfeeding as needed.  Encourage regular follow-ups for ongoing breastfeeding support.  Educate on signs of potential complications (infection, excessive bleeding).  Schedule the next visit as needed for follow-up on postpartum assessment and infant care.  Reassure patient to reach out with any further concerns.  Home Environment:    Home is safe, equipped with smoke and CO2 detectors to ensure their well-being.      Referrals:    None    Education Materials Provided:      POST-BIRTH Warning Signs from [facility]  POST-BIRTH Warning Signs Education Program   [place] On [facility]   Healthy Sleep: For You and Baby   Infant Warning Signs   [facility]    Recommended Immunization Schedule from [facility]   Building Strength with Tummy Time   Infant Crying & Shaken Baby Syndrome   How to Keep Your Child Safe in the Car   Lead Poisoning Risk Checklist from [facility]  Lead Poisoning Prevention   What Does a Safe Sleep Environment Look Like? from [facility]  Safe to Sleep®   Home Safety Checklist from [employer]  Home Safety Checklist   [facility]    Mental Health Disorders from Postpartum Support  International   National Maternal Mental Health Hotline   Online Support Meetings from Postpartum Support International  Weekly Online Support Group  Alonles and Struggles from [facility]    [facility] Connect 2025  How to care for baby's skin  Hear the Beep where you Sleep

## 2025-05-08 RX ORDER — NIFEDIPINE 30 MG/1
30 TABLET, EXTENDED RELEASE ORAL DAILY
Qty: 30 TABLET | Refills: 11 | Status: SHIPPED | OUTPATIENT
Start: 2025-05-08 | End: 2026-05-08

## 2025-05-16 ENCOUNTER — PATIENT MESSAGE (OUTPATIENT)
Dept: OBSTETRICS AND GYNECOLOGY | Facility: CLINIC | Age: 45
End: 2025-05-16

## 2025-05-16 ENCOUNTER — POSTPARTUM VISIT (OUTPATIENT)
Dept: OBSTETRICS AND GYNECOLOGY | Facility: CLINIC | Age: 45
End: 2025-05-16
Payer: COMMERCIAL

## 2025-05-16 VITALS
SYSTOLIC BLOOD PRESSURE: 99 MMHG | WEIGHT: 162.5 LBS | DIASTOLIC BLOOD PRESSURE: 74 MMHG | HEIGHT: 65 IN | BODY MASS INDEX: 27.07 KG/M2

## 2025-05-16 PROCEDURE — 99999 PR PBB SHADOW E&M-EST. PATIENT-LVL III: CPT | Mod: PBBFAC,,, | Performed by: STUDENT IN AN ORGANIZED HEALTH CARE EDUCATION/TRAINING PROGRAM

## 2025-05-16 NOTE — PROGRESS NOTES
"CC: Post-partum follow-up    Aurora Pringle is a 44 y.o. female  presents for post-partum visit s/p a , due to arrest of descent.  She reports she is doing well. Abdominal and back rash healed. She stopped taking Procardia about two weeks ago with normal BP.    Delivery Date: 2025  Delivery MD: Pravin Knowles MD  Gender: female  Birth Weight: 6 pounds 12 ounces  Breast Feeding: YES and supplementing with formula  Depression: NO  Contraception: no method    Pregnancy was complicated by:  PreE w/ SF - discharged on Procardia 60 XL      BP 99/74 (Patient Position: Sitting)   Ht 5' 5" (1.651 m)   Wt 73.7 kg (162 lb 7.7 oz)   LMP 2024   Breastfeeding Yes   BMI 27.04 kg/m²     ROS:  GENERAL: No fever, chills, fatigability or weight loss.  VULVAR: No pain, no lesions and no itching.  VAGINAL: No relaxation, no itching, no discharge, no abnormal bleeding and no lesions.  ABDOMEN: No abdominal pain. Denies nausea. Denies vomiting. No diarrhea. No constipation  BREAST: Denies pain. No lumps. No discharge.  URINARY: No incontinence, no nocturia, no frequency and no dysuria.  CARDIOVASCULAR: No chest pain. No shortness of breath. No leg cramps.  NEUROLOGICAL: No headaches. No vision changes.    PHYSICAL EXAM:  ABD: pfannenstiel incision well healed        Diagnosis:  No diagnosis found.    Plan:   - No longer requires BP meds  - Declines contraception at this time. Will discuss resuming OCPs if menstrual migraines return         Patient was counseled today on A.C.S. Pap guidelines and recommendations for yearly pelvic exams and monthly self breast exams; to see her PCP for other health maintenance.    FOLLOW UP: in 1 year for routine exam    Pravin Knowles M.D.    "

## 2025-05-30 ENCOUNTER — CLINICAL SUPPORT (OUTPATIENT)
Facility: CLINIC | Age: 45
End: 2025-05-30
Payer: COMMERCIAL

## 2025-05-30 NOTE — PROGRESS NOTES
Nurses Note - [facility] Follow-Up Visit  Date: 2025  Patient: 44-year-old female  Infant: Female, born via IVF on 2025 at 38 weeks, 1 day gestation, delivered via primary lower segment  section (pLTCS)    Visit summary:  During todays follow-up visit, the patient reports that she is recovering physically well from her recent birth. She states no current concerns regarding her postpartum recovery or the infants health.   Postpartum assessment:    Lochia: Stopped, no unusual odor or bleeding.  Incisions: Healing appropriately without signs of infection or dehiscence.  Vital signs: Within normal limits.(2025 last PP visit)  Emotional well-being: The patient appears overwhelmed and reports she is not getting adequate sleep, which is impacting her overall well-being. She expressed feelings of being a bit overwhelmed with her new role.  Additional notes:    The patient is no longer on blood pressure medication, indicating stable blood pressure postpartum.  Support and interventions:    The patient has hired a nanny to assist with infant care, which she finds helpful.  During the visit, support was provided to address her concerns, and she was given tools and encouragement to help manage stress and fatigue during this challenging adjustment period.  Plan:    Continue monitoring her recovery and emotional well-being.  Encourage her to prioritize rest and utilize support systems.  Follow-up in one month or sooner if she experiences worsening symptoms, significant emotional distress, or other concerns.    DARLING Shaver, RN  [facility]

## 2025-06-12 ENCOUNTER — E-VISIT (OUTPATIENT)
Dept: OBSTETRICS AND GYNECOLOGY | Facility: CLINIC | Age: 45
End: 2025-06-12
Payer: COMMERCIAL

## 2025-06-12 DIAGNOSIS — N64.4 BREAST PAIN, LEFT: Primary | ICD-10-CM

## 2025-06-12 RX ORDER — DICLOXACILLIN SODIUM 250 MG/1
500 CAPSULE ORAL 4 TIMES DAILY
Qty: 80 CAPSULE | Refills: 0 | Status: SHIPPED | OUTPATIENT
Start: 2025-06-12 | End: 2025-06-22

## 2025-06-12 NOTE — PROGRESS NOTES
Patient ID: Aurora Pringle is a 44 y.o. female.        E-Visit Time Tracking:   Day 1 Time (in minutes): 5  Total Time (in minutes): 5      Chief Complaint: General Illness (Entered automatically based on patient selection in [employer].)      The patient initiated a request through [employer] on 6/12/2025 for evaluation and management with a chief complaint of General Illness (Entered automatically based on patient selection in [employer].)     I evaluated the questionnaire submission on 06/12/2025.    [facility]    6/12/2025 10:35 AM CDT - Filed by Patient   What do you need help with? Other Concern   Do you agree to participate in an E-Visit? Yes   If you have any of the following symptoms, please go to the nearest emergency room or call 911: I acknowledge   Do you have any of the following pregnancy-related conditions? Breast feeding   What is the main issue you would like addressed today? Pain in left breast   Please describe your symptoms. Sharp, stabbing pain in left breast w/o other mastitis symptoms;  intense pain during latching, too.   Where is your problem located? Left breast;  pain is intermittent w/o discernable pattern, though.   On a scale of 1-10, where 10 is the worst you can imagine, how severe are your symptoms? (range: 1 - 10) 9   Have you had these symptoms before? No   How long have you had these symptoms? About a week   What helps with your symptoms? Acetaminophen   What makes your symptoms feel worse? n/a   Are your symptoms related to a condition you currently have? Not sure   Please describe any probable cause for your symptoms. Breastfeeding   Provide any additional information you feel is important.    Please attach any relevant images or files    Are you able to take your vital signs? No         Encounter Diagnosis   Name Primary?    Breast pain, left Yes        No orders of the defined types were placed in this encounter.     Medications Ordered This Encounter    Medications    dicloxacillin (DYNAPEN) 250 MG capsule     Sig: Take 2 capsules (500 mg total) by mouth 4 (four) times daily. for 10 days     Dispense:  80 capsule     Refill:  0        No follow-ups on file.

## 2025-06-27 ENCOUNTER — CLINICAL SUPPORT (OUTPATIENT)
Facility: CLINIC | Age: 45
End: 2025-06-27
Payer: COMMERCIAL

## 2025-06-27 NOTE — PROGRESS NOTES
Nursing Note - [facility] Visit  Date: 06/27/2025  Patient: Female, 44 years old  Birth Details: Infant female, born via LTCS on 04/04/2024 at 38 weeks 1 day gestation  Visit Summary:  During today's family connect visit, the patient reported no acute concerns. She indicated that she is beginning to adjust to parenthood and is feeling excited about what lies ahead. She expressed a positive outlook on her postpartum journey.  Postpartum Assessment:    General appearance: Well-nourished, alert, and oriented  Mood: Calm, positive, and engaged  Breast assessment: Patient reports discomfort in her breasts, which she is managing with Tylenol twice daily. She is scheduled to see Dr. Tavarez for further evaluation of this discomfort.  Physical health: No signs of infection or unusual pain aside from breast discomfort  Infant care: Adequate, with bonding reported as good  Support and Future Plans:  The patient is receiving support from a nanny to assist with infant care. She planned to travel in the near future to visit family in [place].  Other Concerns:  No other concerns or complaints were noted at this time.  Plan:    Continue monitoring postpartum recovery  Follow-up with primary care provider and pediatrician as scheduled  No further visits scheduled under the [facility] program at this time    DARLING Shaver, RN  [facility] [employer]

## (undated) DEVICE — PILLOW FETAL